# Patient Record
Sex: MALE | Race: WHITE | NOT HISPANIC OR LATINO | Employment: OTHER | URBAN - METROPOLITAN AREA
[De-identification: names, ages, dates, MRNs, and addresses within clinical notes are randomized per-mention and may not be internally consistent; named-entity substitution may affect disease eponyms.]

---

## 2017-05-18 ENCOUNTER — PATIENT MESSAGE (OUTPATIENT)
Dept: INTERNAL MEDICINE | Facility: CLINIC | Age: 57
End: 2017-05-18

## 2017-05-23 ENCOUNTER — OFFICE VISIT (OUTPATIENT)
Dept: SLEEP MEDICINE | Facility: CLINIC | Age: 57
End: 2017-05-23
Attending: INTERNAL MEDICINE
Payer: COMMERCIAL

## 2017-05-23 ENCOUNTER — LAB VISIT (OUTPATIENT)
Dept: LAB | Facility: OTHER | Age: 57
End: 2017-05-23
Attending: INTERNAL MEDICINE
Payer: COMMERCIAL

## 2017-05-23 ENCOUNTER — OFFICE VISIT (OUTPATIENT)
Dept: INTERNAL MEDICINE | Facility: CLINIC | Age: 57
End: 2017-05-23
Attending: INTERNAL MEDICINE
Payer: COMMERCIAL

## 2017-05-23 VITALS
BODY MASS INDEX: 28.83 KG/M2 | HEIGHT: 71 IN | HEART RATE: 86 BPM | OXYGEN SATURATION: 97 % | SYSTOLIC BLOOD PRESSURE: 136 MMHG | WEIGHT: 205.94 LBS | DIASTOLIC BLOOD PRESSURE: 82 MMHG

## 2017-05-23 VITALS
WEIGHT: 205.69 LBS | HEIGHT: 71 IN | HEART RATE: 64 BPM | BODY MASS INDEX: 28.8 KG/M2 | OXYGEN SATURATION: 98 % | DIASTOLIC BLOOD PRESSURE: 86 MMHG | SYSTOLIC BLOOD PRESSURE: 148 MMHG

## 2017-05-23 DIAGNOSIS — Z00.00 ANNUAL PHYSICAL EXAM: ICD-10-CM

## 2017-05-23 DIAGNOSIS — Z00.00 ANNUAL PHYSICAL EXAM: Primary | ICD-10-CM

## 2017-05-23 DIAGNOSIS — Z86.19 HISTORY OF HEPATITIS C: ICD-10-CM

## 2017-05-23 DIAGNOSIS — Z72.0 TOBACCO ABUSE: ICD-10-CM

## 2017-05-23 DIAGNOSIS — R06.81 WITNESSED EPISODE OF APNEA: ICD-10-CM

## 2017-05-23 DIAGNOSIS — G47.33 OSA (OBSTRUCTIVE SLEEP APNEA): Primary | ICD-10-CM

## 2017-05-23 DIAGNOSIS — M54.2 CHRONIC NECK PAIN: ICD-10-CM

## 2017-05-23 DIAGNOSIS — G89.29 CHRONIC NECK PAIN: ICD-10-CM

## 2017-05-23 DIAGNOSIS — Z11.59 NEED FOR HEPATITIS C SCREENING TEST: ICD-10-CM

## 2017-05-23 LAB
ALBUMIN SERPL BCP-MCNC: 4.1 G/DL
ALP SERPL-CCNC: 53 U/L
ALT SERPL W/O P-5'-P-CCNC: 20 U/L
ANION GAP SERPL CALC-SCNC: 12 MMOL/L
AST SERPL-CCNC: 20 U/L
BASOPHILS # BLD AUTO: 0.04 K/UL
BASOPHILS NFR BLD: 0.8 %
BILIRUB SERPL-MCNC: 0.6 MG/DL
BUN SERPL-MCNC: 17 MG/DL
CALCIUM SERPL-MCNC: 9.6 MG/DL
CHLORIDE SERPL-SCNC: 105 MMOL/L
CHOLEST/HDLC SERPL: 4.4 {RATIO}
CO2 SERPL-SCNC: 24 MMOL/L
COMPLEXED PSA SERPL-MCNC: 1.1 NG/ML
CREAT SERPL-MCNC: 1 MG/DL
DIFFERENTIAL METHOD: ABNORMAL
EOSINOPHIL # BLD AUTO: 0.2 K/UL
EOSINOPHIL NFR BLD: 2.8 %
ERYTHROCYTE [DISTWIDTH] IN BLOOD BY AUTOMATED COUNT: 13.2 %
EST. GFR  (AFRICAN AMERICAN): >60 ML/MIN/1.73 M^2
EST. GFR  (NON AFRICAN AMERICAN): >60 ML/MIN/1.73 M^2
GLUCOSE SERPL-MCNC: 97 MG/DL
HCT VFR BLD AUTO: 44.6 %
HDL/CHOLESTEROL RATIO: 22.8 %
HDLC SERPL-MCNC: 193 MG/DL
HDLC SERPL-MCNC: 44 MG/DL
HGB BLD-MCNC: 14.8 G/DL
LDLC SERPL CALC-MCNC: 124.4 MG/DL
LYMPHOCYTES # BLD AUTO: 2 K/UL
LYMPHOCYTES NFR BLD: 38.1 %
MCH RBC QN AUTO: 31.9 PG
MCHC RBC AUTO-ENTMCNC: 33.2 %
MCV RBC AUTO: 96 FL
MONOCYTES # BLD AUTO: 0.4 K/UL
MONOCYTES NFR BLD: 8.3 %
NEUTROPHILS # BLD AUTO: 2.6 K/UL
NEUTROPHILS NFR BLD: 49.8 %
NONHDLC SERPL-MCNC: 149 MG/DL
PLATELET # BLD AUTO: 209 K/UL
PMV BLD AUTO: 11.2 FL
POTASSIUM SERPL-SCNC: 4.4 MMOL/L
PROT SERPL-MCNC: 7.4 G/DL
RBC # BLD AUTO: 4.64 M/UL
SODIUM SERPL-SCNC: 141 MMOL/L
TRIGL SERPL-MCNC: 123 MG/DL
TSH SERPL DL<=0.005 MIU/L-ACNC: 2.2 UIU/ML
WBC # BLD AUTO: 5.27 K/UL

## 2017-05-23 PROCEDURE — 86803 HEPATITIS C AB TEST: CPT

## 2017-05-23 PROCEDURE — 99243 OFF/OP CNSLTJ NEW/EST LOW 30: CPT | Mod: S$GLB,,, | Performed by: INTERNAL MEDICINE

## 2017-05-23 PROCEDURE — 36415 COLL VENOUS BLD VENIPUNCTURE: CPT

## 2017-05-23 PROCEDURE — 1160F RVW MEDS BY RX/DR IN RCRD: CPT | Mod: S$GLB,,, | Performed by: INTERNAL MEDICINE

## 2017-05-23 PROCEDURE — 80053 COMPREHEN METABOLIC PANEL: CPT

## 2017-05-23 PROCEDURE — 85025 COMPLETE CBC W/AUTO DIFF WBC: CPT

## 2017-05-23 PROCEDURE — 99999 PR PBB SHADOW E&M-EST. PATIENT-LVL IV: CPT | Mod: PBBFAC,,, | Performed by: INTERNAL MEDICINE

## 2017-05-23 PROCEDURE — 80061 LIPID PANEL: CPT

## 2017-05-23 PROCEDURE — 87522 HEPATITIS C REVRS TRNSCRPJ: CPT

## 2017-05-23 PROCEDURE — 83036 HEMOGLOBIN GLYCOSYLATED A1C: CPT

## 2017-05-23 PROCEDURE — 99214 OFFICE O/P EST MOD 30 MIN: CPT | Mod: S$GLB,,, | Performed by: INTERNAL MEDICINE

## 2017-05-23 PROCEDURE — 84153 ASSAY OF PSA TOTAL: CPT

## 2017-05-23 PROCEDURE — 99999 PR PBB SHADOW E&M-EST. PATIENT-LVL III: CPT | Mod: PBBFAC,,, | Performed by: INTERNAL MEDICINE

## 2017-05-23 PROCEDURE — 84443 ASSAY THYROID STIM HORMONE: CPT

## 2017-05-23 RX ORDER — DIPHENHYDRAMINE HCL 25 MG
4 CAPSULE ORAL
Qty: 50 EACH | Refills: 5 | Status: SHIPPED | OUTPATIENT
Start: 2017-05-23 | End: 2017-10-22 | Stop reason: SDUPTHER

## 2017-05-23 NOTE — PROGRESS NOTES
Subjective:       Patient ID: Fabian Ivy is a 57 y.o. male.    Chief Complaint: Fatigue    Here for annual exam    Mother recently passed. Continues to suffer from low energy. He wakes feeling un refreshed. He states he no longer has energy to exercise when he gets home. He admits he does have occasional depressed mood but overall does not suffer from daily depressive symptoms.  Wakes not feeling refreshed. Does not exercise. Girlfriend has mentioned that he stops breathing for a second or two. Nocturia 1-2 times a night if he has beers prior to bed. Denies urinary hesitancy, incomplete emptying, urgency or incontinence. Some occasional post void dribbling. Hx of HCV s/p harvoni. Quit smoking for 3 months, relapsed a little during [place] but has now been cigarette free for 3 weeks. He is optimistic and excited about continued cessation. Continued neck pain. He was taking [id number] 1/4-1/2 BID. But stopped this as of 3 weeks ago to see if it was at all contributing to his low energy levels.  Managed by neurosurgeon Luis on [facility]. He is scheduled for updated cervical MRI in near future.               Fatigue   Associated symptoms include fatigue. Pertinent negatives include no abdominal pain, chest pain, chills, coughing, fever, nausea, numbness, rash, sore throat or vomiting.       Review of Systems   Constitutional: Positive for fatigue. Negative for appetite change, chills, fever and unexpected weight change.   HENT: Negative for hearing loss, sore throat and trouble swallowing.    Eyes: Negative for visual disturbance.   Respiratory: Negative for cough, chest tightness and shortness of breath.    Cardiovascular: Negative for chest pain and leg swelling.   Gastrointestinal: Negative for abdominal pain, blood in stool, constipation, diarrhea, nausea and vomiting.   Endocrine: Negative for polydipsia and polyuria.   Genitourinary: Negative for decreased urine volume, difficulty urinating, dysuria,  "frequency, hematuria and urgency.   Musculoskeletal: Negative for gait problem.   Skin: Negative for rash.   Neurological: Negative for dizziness and numbness.   Psychiatric/Behavioral: The patient is not nervous/anxious.        Objective:      Vitals:    05/23/17 1024   BP: 136/82   Pulse: 86   SpO2: 97%   Weight: 93.4 kg (205 lb 14.6 oz)   Height: 5' 11" (1.803 m)      Physical Exam   Constitutional: He is oriented to person, place, and time. He appears well-developed and well-nourished. He does not have a sickly appearance. No distress.   HENT:   Head: Normocephalic and atraumatic.   Eyes: Conjunctivae and EOM are normal. Right eye exhibits no discharge. Left eye exhibits no discharge. No scleral icterus.   Pulmonary/Chest: Effort normal. No respiratory distress.   Abdominal: Normal appearance. He exhibits no distension.   Neurological: He is alert and oriented to person, place, and time.   Skin: Skin is warm and dry. He is not diaphoretic.   Psychiatric: He has a normal mood and affect. His speech is normal.       Assessment:       1. Annual physical exam    2. Witnessed episode of apnea    3. History of hepatitis C    4. Tobacco abuse    5. Chronic neck pain        Plan:       Fabian was seen today for fatigue.    Diagnoses and all orders for this visit:    Annual physical exam  -     Hemoglobin A1c; Future  -     Comprehensive metabolic panel; Future  -     CBC auto differential; Future  -     Lipid panel; Future  -     TSH; Future  -     PSA, Screening; Future    Witnessed episode of apnea  -     Ambulatory consult for Sleep Study    History of hepatitis C  Comments:  Tx Harvoni via [facility] 2015  Orders:  -     HEPATITIS C RNA, QUANTITATIVE, PCR; Future    Tobacco abuse  -     Ambulatory referral to Smoking Cessation Program  -     nicotine polacrilex (NICORETTE) 4 MG Gum; Take 1 each (4 mg total) by mouth as needed.  Chronic neck pain  -f/u with [facility]. Rec daily HEP    RTC in 6 months             Side effects " of medication(s) were discussed in detail and patient voiced understanding.  Patient will call back for any issues or complications.

## 2017-05-23 NOTE — LETTER
May 23, 2017      Dieter Rice MD  [address]  [address]  [facility] [address]           [facility]  [address]  [facility] [address]  Phone: [phone]          Patient: Fabian Ivy   MR Number: [id number]   YOB: 1960   Date of Visit: 5/23/2017       Dear Dr. Dieter Rice:    Thank you for referring Fabian Ivy to me for evaluation. Attached you will find relevant portions of my assessment and plan of care.    If you have questions, please do not hesitate to call me. I look forward to following Fabian Ivy along with you.    Sincerely,    Peter Grimes MD    Enclosure  CC:  No Recipients    If you would like to receive this communication electronically, please contact [email or web address] or [phone] to request more information on [employer] access.    For providers and/or their staff who would like to refer a patient to [facility], please contact us through our one-stop-shop provider referral line, [facility], at [phone].    If you feel you have received this communication in error or would no longer like to receive these types of communications, please e-mail [email or web address]

## 2017-05-23 NOTE — PROGRESS NOTES
Fabian Ivy  was seen as a new patient at the request of  Dieter Rice MD for the evaluation of  ramon.    CHIEF COMPLAINT:  ramon    HISTORY OF PRESENT ILLNESS: Fabian Ivy is a 57 y.o. male is here for sleep evaluation.   Patient with loud snoring that's worse when sleeping on back.  +witnessed apnea by girlfriend.  +fatigue upon awake 3-4 times per week.  No sleepiness a/w driving.  No parasomnia.  No cataplexy.      +itchiness in shin area that improve with lotion.      [place] Sleepiness Scale score during initial sleep evaluation was 13.    SLEEP ROUTINE:  Activity the hour prior to sleep:  Shower and lotion    Bed partner:  girlfriend  Time to bed:  11 pm   Lights off:  yes  Sleep onset latency:  10 minutes        Disruptions or awakenings:    2 times    Wakeup time:     7 am   Perceived sleep quality:   Tire upon awake on most days       Daytime naps:      Occasional 60 minutes (tire upon awake)  Weekend sleep routine:      11 pm to 8 am   Caffeine use: 1-2 cups per day  exercise habit:   Bike 1 hour per day      PAST MEDICAL HISTORY:    Active Ambulatory Problems     Diagnosis Date Noted    Witnessed episode of apnea 05/23/2017    History of hepatitis C 05/23/2017    Chronic neck pain 05/23/2017    Tobacco abuse 05/23/2017     Resolved Ambulatory Problems     Diagnosis Date Noted    No Resolved Ambulatory Problems     Past Medical History:   Diagnosis Date    Hepatitis C     Hypertension                 PAST SURGICAL HISTORY:    No past surgical history on file.      FAMILY HISTORY:                Family History   Problem Relation Age of Onset    Cancer Father      lung       SOCIAL HISTORY:          Tobacco:   History   Smoking Status    Former Smoker    Packs/day: 0.50    Years: 35.00    Types: Cigarettes    Quit date: 1/23/2017   Smokeless Tobacco    Not on file       alcohol use:    History   Alcohol Use No                 Occupation:  [employer]    ALLERGIES:  Review of patient's  "allergies indicates:  No Known Allergies    CURRENT MEDICATIONS:    Current Outpatient Prescriptions   Medication Sig Dispense Refill    cyclobenzaprine (FLEXERIL) 10 MG tablet Take 1 tablet (10 mg total) by mouth 3 (three) times daily as needed for Muscle spasms. 90 tablet 2    etodolac (LODINE XL) 400 MG 24 hr tablet   2    hydrocodone-acetaminophen 10-325mg (NORCO)  mg Tab       nicotine polacrilex (NICORETTE) 4 MG Gum Take 1 each (4 mg total) by mouth as needed. 50 each 5     No current facility-administered medications for this visit.                   REVIEW OF SYSTEMS:     Sleep related symptoms as per HPI.  CONST:Denies weight gain    HEENT: rare sinus congestion  PULM: Denies dyspnea  CARD:  Denies palpitations   GI:  Denies acid reflux  : Denies polyuria  NEURO: Denies headaches  PSYCH: Denies mood disturbance  HEME: Denies anemia   Otherwise, a balance of systems reviewed is negative.          PHYSICAL EXAM:  Vitals:    05/23/17 1151   BP: (!) 148/86   Pulse: 64   SpO2: 98%   Weight: 93.3 kg (205 lb 11 oz)   Height: 5' 11" (1.803 m)   PainSc: 0-No pain     Body mass index is 28.69 kg/m².     GENERAL: Normal development, well groomed  HEENT:  Conjunctivae are non-erythematous; Pupils equal, round, and reactive to light; Nose is symmetrical; Nasal mucosa is pink and moist; Septum is midline; Inferior turbinates are normal; Nasal airflow is normal; Posterior pharynx is pink; Modified Mallampati: 4; Posterior palate is normal; Tonsils +1; Uvula is normal and pink;Tongue is normal; Dentition is fair; No TMJ tenderness; Jaw opening and protrusion without click and without discomfort.  NECK: Supple. Neck circumference is 16 inches. No thyromegaly. No palpable nodes.     SKIN: On face and neck: No abrasions, no rashes, no lesions.  No subcutaneous nodules are palpable.  RESPIRATORY: Chest is clear to auscultation.  Normal chest expansion and non-labored breathing at rest.  CARDIOVASCULAR: Normal S1, " S2.  No murmurs, gallops or rubs. No carotid bruits bilaterally.  EXTREMITIES: No edema. No clubbing. No cyanosis. Station normal. Gait normal.        NEURO/PSYCH: Oriented to time, place and person. Normal attention span and concentration. Affect is full. Mood is normal.                                              DATA no prior sleep study  Lab Results   Component Value Date    TSH 1.186 09/09/2015     ASSESSMENT    ICD-10-CM ICD-9-CM    1. TORY (obstructive sleep apnea) G47.33 327.23 Home Sleep Studies       PLAN:    Sleep Apnea NEC. The patient symptomatically has loud snoring, restless sleep, daytime somnolence with findings of enlarged neck. This warrants further investigation for possible obstructive sleep apnea.  Patient will be contacted after sleep study is done.        Diagnostic: Polysomnogram. The nature of this procedure and its indication was discussed with the patient.     Education: During our discussion today, we talked about the etiology of obstructive sleep apnea as well as the potential ramifications of untreated sleep apnea, which could include daytime sleepiness, hypertension, heart disease and/or stroke.     Precautions: The patient was advised to abstain from driving should they feel sleepy or drowsy.       Thank you for allowing me the opportunity to participate in the care of your patient.    Patient will No Follow-up on file.    Please cc note to  Dieter Rice MD.

## 2017-05-24 ENCOUNTER — TELEPHONE (OUTPATIENT)
Dept: SLEEP MEDICINE | Facility: OTHER | Age: 57
End: 2017-05-24

## 2017-05-24 LAB
ESTIMATED AVG GLUCOSE: 123 MG/DL
HBA1C MFR BLD HPLC: 5.9 %
HCV AB SERPL QL IA: POSITIVE

## 2017-05-25 LAB
HCV LOG: <1.08 LOG (10) IU/ML
HCV RNA QUANT PCR: <12 IU/ML
HCV, QUALITATIVE: NOT DETECTED IU/ML

## 2017-05-26 ENCOUNTER — PATIENT MESSAGE (OUTPATIENT)
Dept: INTERNAL MEDICINE | Facility: CLINIC | Age: 57
End: 2017-05-26

## 2017-05-29 NOTE — TELEPHONE ENCOUNTER
Patient concerned about Hep C test. Patient states he had treatment about a year ago and wants to know what to do now.  Please advise.

## 2017-06-27 ENCOUNTER — TELEPHONE (OUTPATIENT)
Dept: SLEEP MEDICINE | Facility: OTHER | Age: 57
End: 2017-06-27

## 2017-06-28 ENCOUNTER — HOSPITAL ENCOUNTER (OUTPATIENT)
Dept: SLEEP MEDICINE | Facility: OTHER | Age: 57
Discharge: HOME | End: 2017-06-28
Attending: INTERNAL MEDICINE
Payer: COMMERCIAL

## 2017-06-28 DIAGNOSIS — G47.33 OSA (OBSTRUCTIVE SLEEP APNEA): ICD-10-CM

## 2017-06-28 PROCEDURE — 95800 SLP STDY UNATTENDED: CPT | Mod: 26,,, | Performed by: INTERNAL MEDICINE

## 2017-06-28 PROCEDURE — 95800 SLP STDY UNATTENDED: CPT

## 2017-07-17 ENCOUNTER — OFFICE VISIT (OUTPATIENT)
Dept: PULMONOLOGY | Facility: CLINIC | Age: 57
End: 2017-07-17
Payer: COMMERCIAL

## 2017-07-17 VITALS
DIASTOLIC BLOOD PRESSURE: 98 MMHG | SYSTOLIC BLOOD PRESSURE: 144 MMHG | BODY MASS INDEX: 28.7 KG/M2 | HEART RATE: 73 BPM | WEIGHT: 205 LBS | HEIGHT: 71 IN | OXYGEN SATURATION: 97 %

## 2017-07-17 DIAGNOSIS — G47.33 OSA (OBSTRUCTIVE SLEEP APNEA): Primary | ICD-10-CM

## 2017-07-17 PROCEDURE — 99214 OFFICE O/P EST MOD 30 MIN: CPT | Mod: S$GLB,,, | Performed by: INTERNAL MEDICINE

## 2017-07-17 PROCEDURE — 99999 PR PBB SHADOW E&M-EST. PATIENT-LVL III: CPT | Mod: PBBFAC,,, | Performed by: INTERNAL MEDICINE

## 2017-07-17 NOTE — PROGRESS NOTES
Fabian Ivy  was seen as a follow up.    CHIEF COMPLAINT:  tory    HISTORY OF PRESENT ILLNESS: Fabian Ivy is a 57 y.o. male is here for sleep evaluation.   Patient with loud snoring that's worse when sleeping on back.  +witnessed apnea by girlfriend.  +fatigue upon awake 3-4 times per week.  No sleepiness a/w driving.  No parasomnia.  No cataplexy.      +itchiness in shin area that improve with lotion.      [place] Sleepiness Scale score during initial sleep evaluation was 13.    S/p hst on 6/28/17 since last visit.  No new issue.      SLEEP ROUTINE:  Activity the hour prior to sleep:  Shower and lotion    Bed partner:  girlfriend  Time to bed:  11 pm   Lights off:  yes  Sleep onset latency:  10 minutes        Disruptions or awakenings:    2 times    Wakeup time:     7 am   Perceived sleep quality:   Tire upon awake on most days       Daytime naps:      Occasional 60 minutes (tire upon awake)  Weekend sleep routine:      11 pm to 8 am   Caffeine use: 1-2 cups per day  exercise habit:   Bike 1 hour per day      PAST MEDICAL HISTORY:    Active Ambulatory Problems     Diagnosis Date Noted    Witnessed episode of apnea 05/23/2017    History of hepatitis C 05/23/2017    Chronic neck pain 05/23/2017    Tobacco abuse 05/23/2017    TORY (obstructive sleep apnea)      Resolved Ambulatory Problems     Diagnosis Date Noted    No Resolved Ambulatory Problems     Past Medical History:   Diagnosis Date    Hepatitis C     Hypertension                 PAST SURGICAL HISTORY:    History reviewed. No pertinent surgical history.      FAMILY HISTORY:                Family History   Problem Relation Age of Onset    Cancer Father      lung       SOCIAL HISTORY:          Tobacco:   History   Smoking Status    Former Smoker    Packs/day: 0.50    Years: 35.00    Types: Cigarettes    Quit date: 1/23/2017   Smokeless Tobacco    Current User     Comment: nicorette gum       alcohol use:    History   Alcohol Use No              "    Occupation:  [employer]    ALLERGIES:  Review of patient's allergies indicates:  No Known Allergies    CURRENT MEDICATIONS:    Current Outpatient Prescriptions   Medication Sig Dispense Refill    cyclobenzaprine (FLEXERIL) 10 MG tablet Take 1 tablet (10 mg total) by mouth 3 (three) times daily as needed for Muscle spasms. 90 tablet 2    etodolac (LODINE XL) 400 MG 24 hr tablet   2    hydrocodone-acetaminophen 10-325mg (NORCO)  mg Tab       nicotine polacrilex (NICORETTE) 4 MG Gum Take 1 each (4 mg total) by mouth as needed. 50 each 5     No current facility-administered medications for this visit.                   REVIEW OF SYSTEMS:     Sleep related symptoms as per HPI.  CONST:Denies weight gain    HEENT: rare sinus congestion  PULM: Denies dyspnea  CARD:  Denies palpitations   GI:  Denies acid reflux  : Denies polyuria  NEURO: Denies headaches  PSYCH: Denies mood disturbance  HEME: Denies anemia   Otherwise, a balance of systems reviewed is negative.          PHYSICAL EXAM:  Vitals:    07/17/17 1154   BP: (!) 144/98   Pulse: 73   SpO2: 97%   Weight: 93 kg (205 lb)   Height: 5' 11" (1.803 m)   PainSc: 0-No pain     Body mass index is 28.59 kg/m².     GENERAL: Normal development, well groomed  HEENT:  Conjunctivae are non-erythematous; Pupils equal, round, and reactive to light; Nose is symmetrical; Nasal mucosa is pink and moist; Septum is midline; Inferior turbinates are normal; Nasal airflow is normal; Posterior pharynx is pink; Modified Mallampati: 4; Posterior palate is normal; Tonsils +1; Uvula is normal and pink;Tongue is normal; Dentition is fair; No TMJ tenderness; Jaw opening and protrusion without click and without discomfort.  NECK: Supple. Neck circumference is 16 inches. No thyromegaly. No palpable nodes.     SKIN: On face and neck: No abrasions, no rashes, no lesions.  No subcutaneous nodules are palpable.  RESPIRATORY: Chest is clear to auscultation.  Normal chest expansion and " non-labored breathing at rest.  CARDIOVASCULAR: Normal S1, S2.  No murmurs, gallops or rubs. No carotid bruits bilaterally.  EXTREMITIES: No edema. No clubbing. No cyanosis. Station normal. Gait normal.        NEURO/PSYCH: Oriented to time, place and person. Normal attention span and concentration. Affect is full. Mood is normal.                                              DATA  6/28/17 hst ahi of 28  Lab Results   Component Value Date    TSH 2.202 05/23/2017     ASSESSMENT    ICD-10-CM ICD-9-CM    1. TORY (obstructive sleep apnea) G47.33 327.23 CPAP FOR HOME USE       PLAN:    Sleep Apnea - result of hst d/w patient.  Recommend treatment.  Patient is agreeable to apap.      Education: During our discussion today, we talked about the etiology of obstructive sleep apnea as well as the potential ramifications of untreated sleep apnea, which could include daytime sleepiness, hypertension, heart disease and/or stroke.     Precautions: The patient was advised to abstain from driving should they feel sleepy or drowsy.       Patient will Return in about 3 months (around 10/17/2017).    This is 25 minutes visit, over 50% of time spent in direct consultation with patient.

## 2017-10-23 RX ORDER — DIPHENHYDRAMINE HCL 25 MG
CAPSULE ORAL
Qty: 160 EACH | Refills: 2 | Status: SHIPPED | OUTPATIENT
Start: 2017-10-23 | End: 2018-05-13 | Stop reason: SDUPTHER

## 2017-10-30 ENCOUNTER — OFFICE VISIT (OUTPATIENT)
Dept: PULMONOLOGY | Facility: CLINIC | Age: 57
End: 2017-10-30
Payer: COMMERCIAL

## 2017-10-30 VITALS
DIASTOLIC BLOOD PRESSURE: 90 MMHG | HEIGHT: 71 IN | OXYGEN SATURATION: 98 % | SYSTOLIC BLOOD PRESSURE: 130 MMHG | BODY MASS INDEX: 28.7 KG/M2 | WEIGHT: 205 LBS | HEART RATE: 70 BPM

## 2017-10-30 DIAGNOSIS — G47.33 OSA (OBSTRUCTIVE SLEEP APNEA): Primary | ICD-10-CM

## 2017-10-30 PROCEDURE — 99214 OFFICE O/P EST MOD 30 MIN: CPT | Mod: S$GLB,,, | Performed by: INTERNAL MEDICINE

## 2017-10-30 PROCEDURE — 99999 PR PBB SHADOW E&M-EST. PATIENT-LVL IV: CPT | Mod: PBBFAC,,, | Performed by: INTERNAL MEDICINE

## 2017-10-30 NOTE — PROGRESS NOTES
Fabian Ivy  was seen as a follow up.    CHIEF COMPLAINT:  tory    HISTORY OF PRESENT ILLNESS: Fabian Ivy is a 57 y.o. male is here for follow up for his sleep apnea. He is feeling well today with no complaints. He states he has been using his CPAP every night. He takes it off ~2 times per night when he gets up to go to the bathroom. He has noted improvement in his daytime sleepiness and states his sleep has improved since he began wearing the mask 2 months ago. Patient is now wearing mask 90% of nights and for an average of 6 hours per night.     He presented to sleep clinic in May 2017 after his girlfriend reported loud snoring that's worse when sleeping on back.  +witnessed apnea by girlfriend.  +fatigue upon awake 3-4 times per week.  No sleepiness a/w driving.  No parasomnia.  No cataplexy.  He is s/p sleep study on 6/28    [place] Sleepiness Scale score during initial sleep evaluation was 13.      SLEEP ROUTINE:  Activity the hour prior to sleep:  Shower and lotion    Bed partner:  girlfriend  Time to bed:  11 pm   Lights off:  yes  Sleep onset latency:  10 minutes        Disruptions or awakenings:    2 times    Wakeup time:     7 am   Perceived sleep quality:   Improvement in daytime sleepiness since wearing mask.   Daytime naps:      Occasional 60 minutes (tire upon awake)  Weekend sleep routine:      11 pm to 8 am   Caffeine use: 1-2 cups per day  exercise habit:   Bike 1 hour per day      PAST MEDICAL HISTORY:    Active Ambulatory Problems     Diagnosis Date Noted    Witnessed episode of apnea 05/23/2017    History of hepatitis C 05/23/2017    Chronic neck pain 05/23/2017    Tobacco abuse 05/23/2017    TORY (obstructive sleep apnea)      Resolved Ambulatory Problems     Diagnosis Date Noted    No Resolved Ambulatory Problems     Past Medical History:   Diagnosis Date    Hepatitis C     Hypertension                 PAST SURGICAL HISTORY:    History reviewed. No pertinent surgical history.      FAMILY  "HISTORY:                Family History   Problem Relation Age of Onset    Cancer Father      lung       SOCIAL HISTORY:          Tobacco:   History   Smoking Status    Former Smoker    Packs/day: 0.50    Years: 35.00    Types: Cigarettes    Quit date: 1/23/2017   Smokeless Tobacco    Current User     Comment: nicorette gum       alcohol use:    History   Alcohol Use No                 Occupation:  [employer]    ALLERGIES:  Review of patient's allergies indicates:  No Known Allergies    CURRENT MEDICATIONS:    Current Outpatient Prescriptions   Medication Sig Dispense Refill    cyclobenzaprine (FLEXERIL) 10 MG tablet Take 1 tablet (10 mg total) by mouth 3 (three) times daily as needed for Muscle spasms. 90 tablet 2    etodolac (LODINE XL) 400 MG 24 hr tablet   2    hydrocodone-acetaminophen 10-325mg (NORCO)  mg Tab       nicotine polacrilex (NICORETTE) 4 MG Gum CHEW AS NEEDED 160 each 2     No current facility-administered medications for this visit.                   REVIEW OF SYSTEMS:     Sleep related symptoms as per HPI.  CONST:Denies weight gain    HEENT: rare sinus congestion  PULM: Denies dyspnea  CARD:  Denies palpitations   GI:  Denies acid reflux  : Denies polyuria  NEURO: Denies headaches  PSYCH: Denies mood disturbance  HEME: Denies anemia   Otherwise, a balance of systems reviewed is negative.          PHYSICAL EXAM:  Vitals:    10/30/17 0924   BP: (!) 130/90   Pulse: 70   SpO2: 98%   Weight: 93 kg (205 lb)   Height: 5' 11" (1.803 m)   PainSc:   4   PainLoc: Shoulder     Body mass index is 28.59 kg/m².     GENERAL: Normal development, well groomed  HEENT:  Conjunctivae are non-erythematous; Pupils equal, round, and reactive to light; Nose is symmetrical; Nasal mucosa is pink and moist; Septum is midline; Inferior turbinates are normal; Nasal airflow is normal; Posterior pharynx is pink; Modified Mallampati: 4; Posterior palate is normal; Tonsils +1; Uvula is normal and pink;Tongue " is normal; Dentition is fair; No TMJ tenderness; Jaw opening and protrusion without click and without discomfort.  NECK: Supple. Neck circumference is 16 inches. No thyromegaly. No palpable nodes.     SKIN: On face and neck: No abrasions, no rashes, no lesions.  No subcutaneous nodules are palpable.  RESPIRATORY: Chest is clear to auscultation.  Normal chest expansion and non-labored breathing at rest.  CARDIOVASCULAR: Normal S1, S2.  No murmurs, gallops or rubs. No carotid bruits bilaterally.  EXTREMITIES: No edema. No clubbing. No cyanosis. Station normal. Gait normal.        NEURO/PSYCH: Oriented to time, place and person. Normal attention span and concentration. Affect is full. Mood is normal.                                              DATA  6/28/17 hst ahi of 28  Lab Results   Component Value Date    TSH 2.202 05/23/2017     ASSESSMENT    ICD-10-CM ICD-9-CM    1. TORY (obstructive sleep apnea) G47.33 327.23        PLAN:    Sleep Apnea: Patient is now wearing mask 90% of nights and for an average of 6 hours per night. Improvement in symptoms of daytime sleepiness and restless sleep since wearing the mask. No current issues. Recommended patient wear [facility] for weekend trips.     Education:  During our discussion today, we talked about the etiology of obstructive sleep apnea as well as the potential ramifications of untreated sleep apnea, which could include daytime sleepiness, hypertension, heart disease and/or stroke.     Precautions: The patient was advised to abstain from driving should they feel sleepy or drowsy.       Patient will Return in about 1 year (around 10/30/2018).     Discussed with Dr. Grimes.     Radha Guzman MD PGY-3

## 2017-11-13 RX ORDER — NICOTINE POLACRILEX 4 MG/1
GUM, CHEWING ORAL
Qty: 50 EACH | Refills: 0 | Status: SHIPPED | OUTPATIENT
Start: 2017-11-13 | End: 2021-11-16

## 2018-01-04 RX ORDER — CYCLOBENZAPRINE HCL 10 MG
TABLET ORAL
Qty: 90 TABLET | Refills: 0 | Status: SHIPPED | OUTPATIENT
Start: 2018-01-04 | End: 2018-11-25 | Stop reason: SDUPTHER

## 2018-05-14 RX ORDER — DIPHENHYDRAMINE HCL 25 MG
CAPSULE ORAL
Qty: 170 EACH | Refills: 1 | Status: SHIPPED | OUTPATIENT
Start: 2018-05-14 | End: 2021-11-16

## 2018-09-23 RX ORDER — DIPHENHYDRAMINE HCL 25 MG
CAPSULE ORAL
Refills: 0 | OUTPATIENT
Start: 2018-09-23

## 2018-11-25 ENCOUNTER — OFFICE VISIT (OUTPATIENT)
Dept: URGENT CARE | Facility: CLINIC | Age: 58
End: 2018-11-25
Payer: COMMERCIAL

## 2018-11-25 VITALS
WEIGHT: 205 LBS | OXYGEN SATURATION: 98 % | RESPIRATION RATE: 16 BRPM | HEART RATE: 63 BPM | BODY MASS INDEX: 28.7 KG/M2 | SYSTOLIC BLOOD PRESSURE: 121 MMHG | TEMPERATURE: 99 F | DIASTOLIC BLOOD PRESSURE: 79 MMHG | HEIGHT: 71 IN

## 2018-11-25 DIAGNOSIS — Z87.39 HISTORY OF HERNIATED INTERVERTEBRAL DISC: ICD-10-CM

## 2018-11-25 DIAGNOSIS — J06.9 URI WITH COUGH AND CONGESTION: Primary | ICD-10-CM

## 2018-11-25 DIAGNOSIS — M62.838 MUSCLE SPASMS OF NECK: ICD-10-CM

## 2018-11-25 LAB
CTP QC/QA: YES
FLUAV AG NPH QL: NEGATIVE
FLUBV AG NPH QL: NEGATIVE

## 2018-11-25 PROCEDURE — 87804 INFLUENZA ASSAY W/OPTIC: CPT | Mod: QW,S$GLB,, | Performed by: NURSE PRACTITIONER

## 2018-11-25 PROCEDURE — 3008F BODY MASS INDEX DOCD: CPT | Mod: CPTII,S$GLB,, | Performed by: NURSE PRACTITIONER

## 2018-11-25 PROCEDURE — 99214 OFFICE O/P EST MOD 30 MIN: CPT | Mod: 25,S$GLB,, | Performed by: NURSE PRACTITIONER

## 2018-11-25 PROCEDURE — 96372 THER/PROPH/DIAG INJ SC/IM: CPT | Mod: S$GLB,,, | Performed by: EMERGENCY MEDICINE

## 2018-11-25 RX ORDER — ETODOLAC 400 MG/1
TABLET, FILM COATED ORAL
Refills: 1 | COMMUNITY
Start: 2018-10-31 | End: 2023-02-27

## 2018-11-25 RX ORDER — BETAMETHASONE SODIUM PHOSPHATE AND BETAMETHASONE ACETATE 3; 3 MG/ML; MG/ML
9 INJECTION, SUSPENSION INTRA-ARTICULAR; INTRALESIONAL; INTRAMUSCULAR; SOFT TISSUE
Status: COMPLETED | OUTPATIENT
Start: 2018-11-25 | End: 2018-11-25

## 2018-11-25 RX ORDER — BENZONATATE 100 MG/1
200 CAPSULE ORAL 3 TIMES DAILY PRN
Qty: 30 CAPSULE | Refills: 0 | Status: SHIPPED | OUTPATIENT
Start: 2018-11-25 | End: 2019-05-06

## 2018-11-25 RX ORDER — FLUTICASONE PROPIONATE 50 MCG
2 SPRAY, SUSPENSION (ML) NASAL DAILY
Qty: 1 BOTTLE | Refills: 0 | Status: SHIPPED | OUTPATIENT
Start: 2018-11-25 | End: 2019-05-06

## 2018-11-25 RX ORDER — CYCLOBENZAPRINE HCL 10 MG
10 TABLET ORAL 3 TIMES DAILY PRN
Qty: 30 TABLET | Refills: 0 | Status: SHIPPED | OUTPATIENT
Start: 2018-11-25 | End: 2019-05-06

## 2018-11-25 RX ORDER — KETOROLAC TROMETHAMINE 30 MG/ML
30 INJECTION, SOLUTION INTRAMUSCULAR; INTRAVENOUS
Status: COMPLETED | OUTPATIENT
Start: 2018-11-25 | End: 2018-11-25

## 2018-11-25 RX ORDER — FLUCONAZOLE 150 MG/1
TABLET ORAL
Refills: 1 | COMMUNITY
Start: 2018-10-30 | End: 2019-05-06

## 2018-11-25 RX ADMIN — BETAMETHASONE SODIUM PHOSPHATE AND BETAMETHASONE ACETATE 9 MG: 3; 3 INJECTION, SUSPENSION INTRA-ARTICULAR; INTRALESIONAL; INTRAMUSCULAR; SOFT TISSUE at 03:11

## 2018-11-25 RX ADMIN — KETOROLAC TROMETHAMINE 30 MG: 30 INJECTION, SOLUTION INTRAMUSCULAR; INTRAVENOUS at 03:11

## 2018-11-25 NOTE — PATIENT INSTRUCTIONS
Do not take Flexeril and drive     Please drink plenty of fluids.  Please get plenty of rest.  Please return here or go to the Emergency Department for any concerns or worsening of condition.  If you were prescribed antibiotics, please take them to completion.  If you were given wait & see antibiotics, please wait 5-7 days before taking them, and only take them if your symptoms have worsened or not improved.  If you do begin taking the antibiotics, please take them to completion.  If you were prescribed a narcotic medication, do not drive or operate heavy equipment or machinery while taking these medications.  If you were given a steroid shot in the clinic and have also been given a prescription for a steroid such as Prednisone or a Medrol Dose Pack, please begin taking them tomorrow.  If you do not have Hypertension or any history of palpitations, it is ok to take over the counter Sudafed or Mucinex D or Allegra-D or Claritin-D or Zyrtec-D.  If you do take one of the above, it is ok to combine that with plain over the counter Mucinex or Allegra or Claritin or Zyrtec.  If for example you are taking Zyrtec -D, you can combine that with Mucinex, but not Mucinex-D.  If you are taking Mucinex-D, you can combine that with plain Allegra or Claritin or Zyrtec.   Flonase   If you do have Hypertension or palpitations, it is safe to take Coricidin HBP for relief of sinus symptoms.  If not allergic, please take over the counter Tylenol (Acetaminophen) and/or Motrin (Ibuprofen) as directed for control of pain and/or fever.  Please follow up with your primary care doctor or specialist as needed.    If you  smoke, please stop smoking.      Viral Upper Respiratory Illness (Adult)  You have a viral upper respiratory illness (URI), which is another term for the common cold. This illness is contagious during the first few days. It is spread through the air by coughing and sneezing. It may also be spread by direct contact (touching  the sick person and then touching your own eyes, nose, or mouth). Frequent handwashing will decrease risk of spread. Most viral illnesses go away within 7 to 10 days with rest and simple home remedies. Sometimes the illness may last for several weeks. Antibiotics will not kill a virus, and they are generally not prescribed for this condition.    Home care  · If symptoms are severe, rest at home for the first 2 to 3 days. When you resume activity, don't let yourself get too tired.  · Avoid being exposed to cigarette smoke (yours or others).  · You may use acetaminophen or ibuprofen to control pain and fever, unless another medicine was prescribed. (Note: If you have chronic liver or kidney disease, have ever had a stomach ulcer or gastrointestinal bleeding, or are taking blood-thinning medicines, talk with your healthcare provider before using these medicines.) Aspirin should never be given to anyone under 18 years of age who is ill with a viral infection or fever. It may cause severe liver or brain damage.  · Your appetite may be poor, so a light diet is fine. Avoid dehydration by drinking 6 to 8 glasses of fluids per day (water, soft drinks, juices, tea, or soup). Extra fluids will help loosen secretions in the nose and lungs.  · Over-the-counter cold medicines will not shorten the length of time youre sick, but they may be helpful for the following symptoms: cough, sore throat, and nasal and sinus congestion. (Note: Do not use decongestants if you have high blood pressure.)  Follow-up care  Follow up with your healthcare provider, or as advised.  When to seek medical advice  Call your healthcare provider right away if any of these occur:  · Cough with lots of colored sputum (mucus)  · Severe headache; face, neck, or ear pain  · Difficulty swallowing due to throat pain  · Fever of 100.4°F (38°C)  Call 911, or get immediate medical care  Call emergency services right away if any of these occur:  · Chest pain,  shortness of breath, wheezing, or difficulty breathing  · Coughing up blood  · Inability to swallow due to throat pain  Date Last Reviewed: 9/13/2015 © 2000-2017 [employer]. [address] [place] [address], [place] [address]. All rights reserved. This information is not intended as a substitute for professional medical care. Always follow your healthcare professional's instructions.        Back Spasm (No Trauma)    Spasm of the back muscles can occur after a sudden forceful twisting or bending force (such as in a car accident), after a simple awkward movement, or after lifting something heavy with poor body positioning. In any case, muscle spasm adds to the pain. Sleeping in an awkward position or on a poor quality mattress can also cause this. Some people respond to emotional stress by tensing the muscles of their back.  Pain that continues may need further evaluation or other types of treatment such as physical therapy.  You don't always need X-rays for the initial evaluation of back pain, unless you had a physical injury such as from a car accident or fall. If your pain continues and doesn't respond to medical treatment, X-rays and other tests may then be done.   Home care  · As soon as possible, start sitting or walking again to avoid problems from prolonged bed rest (muscle weakness, worsening back stiffness and pain, blood clots in the legs).  · When in bed, try to find a position of comfort. A firm mattress is best. Try lying flat on your back with pillows under your knees. You can also try lying on your side with your knees bent up toward your chest and a pillow between your knees.  · Avoid prolonged sitting, long car rides, or travel. This puts more stress on the lower back than standing or walking.   · During the first 24 to 72 hours after an injury or flare-up, apply an ice pack to the painful area for 20 minutes, then remove it for 20 minutes. Do this over a period of 60 to 90 minutes or several  times a day. This will reduce swelling and pain. Always wrap ice packs in a thin towel.  · You can start with ice, then switch to heat. Heat (hot shower, hot bath, or heating pad) reduces pain, and works well for muscle spasms. Apply heat to the painful area for 20 minutes, then remove it for 20 minutes. Do this over a period of 60 to 90 minutes or several times a day. Do not sleep on a heating pad as it can burn or damage skin.  · Alternate ice and heat therapies.  · Be aware of safe lifting methods and do not lift anything over 15 pounds until all the pain is gone.  Gentle stretching will help your back heal faster. Do this simple routine 2 to 3 times a day until your back is feeling better.  · Lie on your back with your knees bent and both feet on the ground  · Slowly raise your left knee to your chest as you flatten your lower back against the floor. Hold for 20 to 30 seconds.  · Relax and repeat the exercise with your right knee.  · Do 2 to 3 of these exercises for each leg.  · Repeat, hugging both knees to your chest at the same time.  · Do not bounce, but use a gentle pull.  Medicines  Talk to your doctor before using medicine, especially if you have other medical problems or are taking other medicines.  You may use acetaminophen or ibuprofen to control pain, unless your healthcare provider prescribed another pain medicine. If you have a chronic condition such as diabetes, liver or kidney disease, stomach ulcer, or gastrointestinal bleeding, or are taking blood thinners, talk with your healthcare provider before taking any medicines.  Be careful if you are given prescription pain medicine, narcotics, or medicine for muscle spasm. They can cause drowsiness, affect your coordination, reflexes, or judgment. Do not drive or operate heavy machinery when taking these medicines. Take pain medicine only as prescribed by your healthcare provider.  Follow-up care  Follow up with your doctor, or as advised. Physical  therapy or further tests may be needed.  If X-rays were taken, they may be reviewed by a radiologist. You will be notified of any new findings that may affect your care.  Call 911  Seek emergency medical care if any of these occur:  · Trouble breathing  · Confusion  · Drowsiness or trouble awakening  · Fainting or loss of consciousness  · Rapid or very slow heart rate  · Loss of bowel or bladder control  When to seek medical advice  Call your healthcare provider right away if any of these occur:  · Pain becomes worse or spreads to your legs  · Weakness or numbness in one or both legs  · Numbness in the groin or genital area  · Unexplained fever over 100.4ºF (38.0ºC)  · Burning or pain when passing urine  Date Last Reviewed: 6/1/2016  © 6432-6270 [employer]. [address] [place] [address], [place] [address]. All rights reserved. This information is not intended as a substitute for professional medical care. Always follow your healthcare professional's instructions.

## 2018-11-25 NOTE — PROGRESS NOTES
"Subjective:       Patient ID: Fabian Ivy is a 58 y.o. male.    Vitals:  height is 5' 11" (1.803 m) and weight is 93 kg (205 lb). His oral temperature is 98.5 °F (36.9 °C). His blood pressure is 121/79 and his pulse is 63. His respiration is 16 and oxygen saturation is 98%.     Chief Complaint: Neck Pain and Nasal Congestion    Onset from Thursday for neck pain.  Hx Herniated disc. Cold symptoms are cough, sputum production, headache and sinus pressure.     Patient complaining of onset of cold like symptoms started on Thursday evening.  He denies any fever but reports he does feel flu-like.  He has a history of herniated discs in his neck and reports that he began having right sided neck pain and muscle spasms on Thursday as well.  He has hydrocodone at home he reports no relief.  He is currently out of his muscle relaxants.      Neck Pain    This is a chronic problem. The current episode started in the past 7 days. The problem occurs constantly. The problem has been unchanged. The pain is associated with an unknown factor. The pain is at a severity of 7/10. The pain is moderate. The pain is same all the time. Stiffness is present all day. Pertinent negatives include no chest pain, fever or headaches.       Constitution: Negative for chills, fatigue and fever.   HENT: Positive for congestion. Negative for sore throat.    Neck: Positive for neck pain. Negative for painful lymph nodes.   Cardiovascular: Negative for chest pain and leg swelling.   Eyes: Negative for double vision and blurred vision.   Respiratory: Positive for cough and sputum production. Negative for shortness of breath.    Gastrointestinal: Negative for nausea, vomiting and diarrhea.   Genitourinary: Negative for dysuria, frequency and urgency.   Musculoskeletal: Positive for muscle ache. Negative for joint pain, joint swelling and muscle cramps.   Skin: Negative for color change, pale and rash.   Allergic/Immunologic: Negative for seasonal " allergies.   Neurological: Negative for dizziness, history of vertigo, light-headedness, passing out and headaches.   Hematologic/Lymphatic: Negative for swollen lymph nodes, easy bruising/bleeding and history of blood clots. Does not bruise/bleed easily.   Psychiatric/Behavioral: Negative for nervous/anxious, sleep disturbance and depression. The patient is not nervous/anxious.        Objective:      Physical Exam   Constitutional: He is oriented to person, place, and time. He appears well-developed and well-nourished. He is cooperative.  Non-toxic appearance. He does not appear ill. No distress.   NAD  Afebrile    HENT:   Head: Normocephalic and atraumatic.   Right Ear: Hearing, tympanic membrane, external ear and ear canal normal.   Left Ear: Hearing, tympanic membrane, external ear and ear canal normal.   Nose: Mucosal edema and rhinorrhea present. No sinus tenderness or nasal deformity. No epistaxis. Right sinus exhibits no maxillary sinus tenderness and no frontal sinus tenderness. Left sinus exhibits no maxillary sinus tenderness and no frontal sinus tenderness.   Mouth/Throat: Uvula is midline and mucous membranes are normal. No trismus in the jaw. Normal dentition. No uvula swelling. Posterior oropharyngeal edema and posterior oropharyngeal erythema present. No oropharyngeal exudate or tonsillar abscesses.   Cobblestoning to posterior pharynx with clear PND    Eyes: Conjunctivae, EOM and lids are normal. Pupils are equal, round, and reactive to light. No scleral icterus.   Sclera clear bilat   Neck: Trachea normal, normal range of motion, full passive range of motion without pain and phonation normal. Neck supple. Muscular tenderness present. No spinous process tenderness present. No neck rigidity. No edema, no erythema and normal range of motion present. No Brudzinski's sign and no Kernig's sign noted.       Cardiovascular: Normal rate, regular rhythm, normal heart sounds, intact distal pulses and normal  pulses.   Pulmonary/Chest: Effort normal and breath sounds normal. No accessory muscle usage or stridor. No tachypnea. No respiratory distress. He has no decreased breath sounds. He has no wheezes. He has no rhonchi. He has no rales.   Abdominal: Soft. Normal appearance and bowel sounds are normal. He exhibits no distension. There is no tenderness.   Musculoskeletal: Normal range of motion. He exhibits no edema or deformity.   Lymphadenopathy:        Head (right side): Submandibular adenopathy present. No tonsillar adenopathy present.        Head (left side): Submandibular adenopathy present. No tonsillar adenopathy present.     He has no cervical adenopathy.   Neurological: He is alert and oriented to person, place, and time. He exhibits normal muscle tone. Coordination normal.   Skin: Skin is warm, dry and intact. He is not diaphoretic. No pallor.   Psychiatric: He has a normal mood and affect. His speech is normal and behavior is normal. Judgment and thought content normal. Cognition and memory are normal.   Nursing note and vitals reviewed.        Results for orders placed or performed in visit on 11/25/18   POCT Influenza A/B   Result Value Ref Range    Rapid Influenza A Ag Negative Negative    Rapid Influenza B Ag Negative Negative     Acceptable Yes        Assessment:       1. URI with cough and congestion    2. History of herniated intervertebral disc    3. Muscle spasms of neck        Plan:         URI with cough and congestion  -     POCT Influenza A/B  -     fluticasone (FLONASE) 50 mcg/actuation nasal spray; 2 sprays (100 mcg total) by Each Nare route once daily.  Dispense: 1 Bottle; Refill: 0  -     betamethasone acetate-betamethasone sodium phosphate injection 9 mg  -     benzonatate (TESSALON PERLES) 100 MG capsule; Take 2 capsules (200 mg total) by mouth 3 (three) times daily as needed for Cough.  Dispense: 30 capsule; Refill: 0    History of herniated intervertebral disc  -      ketorolac injection 30 mg    Muscle spasms of neck  -     ketorolac injection 30 mg  -     cyclobenzaprine (FLEXERIL) 10 MG tablet; Take 1 tablet (10 mg total) by mouth 3 (three) times daily as needed for Muscle spasms.  Dispense: 30 tablet; Refill: 0    Discussed with patient that this is likely viral. OTC agents for symptom relief reviewed.   He has hydrocodone at home for pain and it etodolac.   He needs a refill on his Flexeril        Patient Instructions   Do not take Flexeril and drive     Please drink plenty of fluids.  Please get plenty of rest.  Please return here or go to the Emergency Department for any concerns or worsening of condition.  If you were prescribed antibiotics, please take them to completion.  If you were given wait & see antibiotics, please wait 5-7 days before taking them, and only take them if your symptoms have worsened or not improved.  If you do begin taking the antibiotics, please take them to completion.  If you were prescribed a narcotic medication, do not drive or operate heavy equipment or machinery while taking these medications.  If you were given a steroid shot in the clinic and have also been given a prescription for a steroid such as Prednisone or a Medrol Dose Pack, please begin taking them tomorrow.  If you do not have Hypertension or any history of palpitations, it is ok to take over the counter Sudafed or Mucinex D or Allegra-D or Claritin-D or Zyrtec-D.  If you do take one of the above, it is ok to combine that with plain over the counter Mucinex or Allegra or Claritin or Zyrtec.  If for example you are taking Zyrtec -D, you can combine that with Mucinex, but not Mucinex-D.  If you are taking Mucinex-D, you can combine that with plain Allegra or Claritin or Zyrtec.   Flonase   If you do have Hypertension or palpitations, it is safe to take Coricidin HBP for relief of sinus symptoms.  If not allergic, please take over the counter Tylenol (Acetaminophen) and/or Motrin  (Ibuprofen) as directed for control of pain and/or fever.  Please follow up with your primary care doctor or specialist as needed.    If you  smoke, please stop smoking.      Viral Upper Respiratory Illness (Adult)  You have a viral upper respiratory illness (URI), which is another term for the common cold. This illness is contagious during the first few days. It is spread through the air by coughing and sneezing. It may also be spread by direct contact (touching the sick person and then touching your own eyes, nose, or mouth). Frequent handwashing will decrease risk of spread. Most viral illnesses go away within 7 to 10 days with rest and simple home remedies. Sometimes the illness may last for several weeks. Antibiotics will not kill a virus, and they are generally not prescribed for this condition.    Home care  · If symptoms are severe, rest at home for the first 2 to 3 days. When you resume activity, don't let yourself get too tired.  · Avoid being exposed to cigarette smoke (yours or others).  · You may use acetaminophen or ibuprofen to control pain and fever, unless another medicine was prescribed. (Note: If you have chronic liver or kidney disease, have ever had a stomach ulcer or gastrointestinal bleeding, or are taking blood-thinning medicines, talk with your healthcare provider before using these medicines.) Aspirin should never be given to anyone under 18 years of age who is ill with a viral infection or fever. It may cause severe liver or brain damage.  · Your appetite may be poor, so a light diet is fine. Avoid dehydration by drinking 6 to 8 glasses of fluids per day (water, soft drinks, juices, tea, or soup). Extra fluids will help loosen secretions in the nose and lungs.  · Over-the-counter cold medicines will not shorten the length of time youre sick, but they may be helpful for the following symptoms: cough, sore throat, and nasal and sinus congestion. (Note: Do not use decongestants if you have  high blood pressure.)  Follow-up care  Follow up with your healthcare provider, or as advised.  When to seek medical advice  Call your healthcare provider right away if any of these occur:  · Cough with lots of colored sputum (mucus)  · Severe headache; face, neck, or ear pain  · Difficulty swallowing due to throat pain  · Fever of 100.4°F (38°C)  Call 911, or get immediate medical care  Call emergency services right away if any of these occur:  · Chest pain, shortness of breath, wheezing, or difficulty breathing  · Coughing up blood  · Inability to swallow due to throat pain  Date Last Reviewed: 9/13/2015  © 1927-0629 [employer]. [address] [place] [address], [place] [address]. All rights reserved. This information is not intended as a substitute for professional medical care. Always follow your healthcare professional's instructions.        Back Spasm (No Trauma)    Spasm of the back muscles can occur after a sudden forceful twisting or bending force (such as in a car accident), after a simple awkward movement, or after lifting something heavy with poor body positioning. In any case, muscle spasm adds to the pain. Sleeping in an awkward position or on a poor quality mattress can also cause this. Some people respond to emotional stress by tensing the muscles of their back.  Pain that continues may need further evaluation or other types of treatment such as physical therapy.  You don't always need X-rays for the initial evaluation of back pain, unless you had a physical injury such as from a car accident or fall. If your pain continues and doesn't respond to medical treatment, X-rays and other tests may then be done.   Home care  · As soon as possible, start sitting or walking again to avoid problems from prolonged bed rest (muscle weakness, worsening back stiffness and pain, blood clots in the legs).  · When in bed, try to find a position of comfort. A firm mattress is best. Try lying flat on your back  with pillows under your knees. You can also try lying on your side with your knees bent up toward your chest and a pillow between your knees.  · Avoid prolonged sitting, long car rides, or travel. This puts more stress on the lower back than standing or walking.   · During the first 24 to 72 hours after an injury or flare-up, apply an ice pack to the painful area for 20 minutes, then remove it for 20 minutes. Do this over a period of 60 to 90 minutes or several times a day. This will reduce swelling and pain. Always wrap ice packs in a thin towel.  · You can start with ice, then switch to heat. Heat (hot shower, hot bath, or heating pad) reduces pain, and works well for muscle spasms. Apply heat to the painful area for 20 minutes, then remove it for 20 minutes. Do this over a period of 60 to 90 minutes or several times a day. Do not sleep on a heating pad as it can burn or damage skin.  · Alternate ice and heat therapies.  · Be aware of safe lifting methods and do not lift anything over 15 pounds until all the pain is gone.  Gentle stretching will help your back heal faster. Do this simple routine 2 to 3 times a day until your back is feeling better.  · Lie on your back with your knees bent and both feet on the ground  · Slowly raise your left knee to your chest as you flatten your lower back against the floor. Hold for 20 to 30 seconds.  · Relax and repeat the exercise with your right knee.  · Do 2 to 3 of these exercises for each leg.  · Repeat, hugging both knees to your chest at the same time.  · Do not bounce, but use a gentle pull.  Medicines  Talk to your doctor before using medicine, especially if you have other medical problems or are taking other medicines.  You may use acetaminophen or ibuprofen to control pain, unless your healthcare provider prescribed another pain medicine. If you have a chronic condition such as diabetes, liver or kidney disease, stomach ulcer, or gastrointestinal bleeding, or are  taking blood thinners, talk with your healthcare provider before taking any medicines.  Be careful if you are given prescription pain medicine, narcotics, or medicine for muscle spasm. They can cause drowsiness, affect your coordination, reflexes, or judgment. Do not drive or operate heavy machinery when taking these medicines. Take pain medicine only as prescribed by your healthcare provider.  Follow-up care  Follow up with your doctor, or as advised. Physical therapy or further tests may be needed.  If X-rays were taken, they may be reviewed by a radiologist. You will be notified of any new findings that may affect your care.  Call 911  Seek emergency medical care if any of these occur:  · Trouble breathing  · Confusion  · Drowsiness or trouble awakening  · Fainting or loss of consciousness  · Rapid or very slow heart rate  · Loss of bowel or bladder control  When to seek medical advice  Call your healthcare provider right away if any of these occur:  · Pain becomes worse or spreads to your legs  · Weakness or numbness in one or both legs  · Numbness in the groin or genital area  · Unexplained fever over 100.4ºF (38.0ºC)  · Burning or pain when passing urine  Date Last Reviewed: 6/1/2016  © 3112-0930 [email or web address]. [address] [place] [address], [place] [address]. All rights reserved. This information is not intended as a substitute for professional medical care. Always follow your healthcare professional's instructions.

## 2018-12-31 ENCOUNTER — PATIENT MESSAGE (OUTPATIENT)
Dept: INTERNAL MEDICINE | Facility: CLINIC | Age: 58
End: 2018-12-31

## 2019-01-05 DIAGNOSIS — J06.9 URI WITH COUGH AND CONGESTION: ICD-10-CM

## 2019-01-06 RX ORDER — FLUTICASONE PROPIONATE 50 MCG
SPRAY, SUSPENSION (ML) NASAL
Qty: 16 ML | Refills: 0 | OUTPATIENT
Start: 2019-01-06

## 2019-05-06 ENCOUNTER — LAB VISIT (OUTPATIENT)
Dept: LAB | Facility: OTHER | Age: 59
End: 2019-05-06
Attending: INTERNAL MEDICINE
Payer: COMMERCIAL

## 2019-05-06 ENCOUNTER — OFFICE VISIT (OUTPATIENT)
Dept: INTERNAL MEDICINE | Facility: CLINIC | Age: 59
End: 2019-05-06
Attending: INTERNAL MEDICINE
Payer: COMMERCIAL

## 2019-05-06 VITALS
DIASTOLIC BLOOD PRESSURE: 92 MMHG | HEIGHT: 71 IN | BODY MASS INDEX: 29.94 KG/M2 | WEIGHT: 213.88 LBS | SYSTOLIC BLOOD PRESSURE: 160 MMHG

## 2019-05-06 DIAGNOSIS — Z00.00 ANNUAL PHYSICAL EXAM: ICD-10-CM

## 2019-05-06 DIAGNOSIS — M25.552 BILATERAL HIP PAIN: ICD-10-CM

## 2019-05-06 DIAGNOSIS — Z00.00 ANNUAL PHYSICAL EXAM: Primary | ICD-10-CM

## 2019-05-06 DIAGNOSIS — I10 BENIGN ESSENTIAL HYPERTENSION: ICD-10-CM

## 2019-05-06 DIAGNOSIS — Z87.891 FORMER SMOKER: ICD-10-CM

## 2019-05-06 DIAGNOSIS — M25.551 BILATERAL HIP PAIN: ICD-10-CM

## 2019-05-06 LAB
ALBUMIN SERPL BCP-MCNC: 4.1 G/DL (ref 3.5–5.2)
ALP SERPL-CCNC: 75 U/L (ref 55–135)
ALT SERPL W/O P-5'-P-CCNC: 41 U/L (ref 10–44)
ANION GAP SERPL CALC-SCNC: 10 MMOL/L (ref 8–16)
AST SERPL-CCNC: 30 U/L (ref 10–40)
BASOPHILS # BLD AUTO: 0.02 K/UL (ref 0–0.2)
BASOPHILS NFR BLD: 0.5 % (ref 0–1.9)
BILIRUB SERPL-MCNC: 0.4 MG/DL (ref 0.1–1)
BUN SERPL-MCNC: 23 MG/DL (ref 8–23)
CALCIUM SERPL-MCNC: 9.4 MG/DL (ref 8.7–10.5)
CHLORIDE SERPL-SCNC: 107 MMOL/L (ref 95–110)
CHOLEST SERPL-MCNC: 171 MG/DL (ref 120–199)
CHOLEST/HDLC SERPL: 3.1 {RATIO} (ref 2–5)
CO2 SERPL-SCNC: 27 MMOL/L (ref 23–29)
COMPLEXED PSA SERPL-MCNC: 1.2 NG/ML (ref 0–4)
CREAT SERPL-MCNC: 1 MG/DL (ref 0.5–1.4)
DIFFERENTIAL METHOD: ABNORMAL
EOSINOPHIL # BLD AUTO: 0.1 K/UL (ref 0–0.5)
EOSINOPHIL NFR BLD: 3.4 % (ref 0–8)
ERYTHROCYTE [DISTWIDTH] IN BLOOD BY AUTOMATED COUNT: 13.8 % (ref 11.5–14.5)
EST. GFR  (AFRICAN AMERICAN): >60 ML/MIN/1.73 M^2
EST. GFR  (NON AFRICAN AMERICAN): >60 ML/MIN/1.73 M^2
GLUCOSE SERPL-MCNC: 110 MG/DL (ref 70–110)
HCT VFR BLD AUTO: 43.5 % (ref 40–54)
HDLC SERPL-MCNC: 55 MG/DL (ref 40–75)
HDLC SERPL: 32.2 % (ref 20–50)
HGB BLD-MCNC: 14.4 G/DL (ref 14–18)
LDLC SERPL CALC-MCNC: 97 MG/DL (ref 63–159)
LYMPHOCYTES # BLD AUTO: 1.4 K/UL (ref 1–4.8)
LYMPHOCYTES NFR BLD: 36.5 % (ref 18–48)
MCH RBC QN AUTO: 32.1 PG (ref 27–31)
MCHC RBC AUTO-ENTMCNC: 33.1 G/DL (ref 32–36)
MCV RBC AUTO: 97 FL (ref 82–98)
MONOCYTES # BLD AUTO: 0.6 K/UL (ref 0.3–1)
MONOCYTES NFR BLD: 14.7 % (ref 4–15)
NEUTROPHILS # BLD AUTO: 1.7 K/UL (ref 1.8–7.7)
NEUTROPHILS NFR BLD: 44.6 % (ref 38–73)
NONHDLC SERPL-MCNC: 116 MG/DL
PLATELET # BLD AUTO: 190 K/UL (ref 150–350)
PMV BLD AUTO: 10.2 FL (ref 9.2–12.9)
POTASSIUM SERPL-SCNC: 4.8 MMOL/L (ref 3.5–5.1)
PROT SERPL-MCNC: 7.4 G/DL (ref 6–8.4)
RBC # BLD AUTO: 4.49 M/UL (ref 4.6–6.2)
SODIUM SERPL-SCNC: 144 MMOL/L (ref 136–145)
TRIGL SERPL-MCNC: 95 MG/DL (ref 30–150)
TSH SERPL DL<=0.005 MIU/L-ACNC: 3.63 UIU/ML (ref 0.4–4)
WBC # BLD AUTO: 3.81 K/UL (ref 3.9–12.7)

## 2019-05-06 PROCEDURE — 85025 COMPLETE CBC W/AUTO DIFF WBC: CPT

## 2019-05-06 PROCEDURE — 80053 COMPREHEN METABOLIC PANEL: CPT

## 2019-05-06 PROCEDURE — 99999 PR PBB SHADOW E&M-EST. PATIENT-LVL III: CPT | Mod: PBBFAC,,, | Performed by: INTERNAL MEDICINE

## 2019-05-06 PROCEDURE — 84443 ASSAY THYROID STIM HORMONE: CPT

## 2019-05-06 PROCEDURE — 3077F SYST BP >= 140 MM HG: CPT | Mod: CPTII,S$GLB,, | Performed by: INTERNAL MEDICINE

## 2019-05-06 PROCEDURE — 80061 LIPID PANEL: CPT

## 2019-05-06 PROCEDURE — 84153 ASSAY OF PSA TOTAL: CPT

## 2019-05-06 PROCEDURE — 36415 COLL VENOUS BLD VENIPUNCTURE: CPT

## 2019-05-06 PROCEDURE — 3080F DIAST BP >= 90 MM HG: CPT | Mod: CPTII,S$GLB,, | Performed by: INTERNAL MEDICINE

## 2019-05-06 PROCEDURE — 99999 PR PBB SHADOW E&M-EST. PATIENT-LVL III: ICD-10-PCS | Mod: PBBFAC,,, | Performed by: INTERNAL MEDICINE

## 2019-05-06 PROCEDURE — 3077F PR MOST RECENT SYSTOLIC BLOOD PRESSURE >= 140 MM HG: ICD-10-PCS | Mod: CPTII,S$GLB,, | Performed by: INTERNAL MEDICINE

## 2019-05-06 PROCEDURE — 99396 PR PREVENTIVE VISIT,EST,40-64: ICD-10-PCS | Mod: S$GLB,,, | Performed by: INTERNAL MEDICINE

## 2019-05-06 PROCEDURE — 99396 PREV VISIT EST AGE 40-64: CPT | Mod: S$GLB,,, | Performed by: INTERNAL MEDICINE

## 2019-05-06 PROCEDURE — 3080F PR MOST RECENT DIASTOLIC BLOOD PRESSURE >= 90 MM HG: ICD-10-PCS | Mod: CPTII,S$GLB,, | Performed by: INTERNAL MEDICINE

## 2019-05-06 RX ORDER — VALSARTAN 40 MG/1
40 TABLET ORAL DAILY
Qty: 90 TABLET | Refills: 3 | Status: SHIPPED | OUTPATIENT
Start: 2019-05-06 | End: 2019-11-06

## 2019-05-06 NOTE — PROGRESS NOTES
"Subjective:       Patient ID: Fabian Ivy is a 59 y.o. male.    Chief Complaint: URI; Back Pain; and Numbness    Here for annual visit      1 month of low back bilateral hip pain that radiates own the sides of his thighs. He has gained about 20lbs in the past 2-3 months. He is off his diet and not exercising as he was before. He is amenable to medication. Denies frequent or current HA, intermittent blurry vision, dizziness, CP, or SOB.         Review of Systems   Constitutional: Negative for appetite change, chills, fever and unexpected weight change.   HENT: Negative for hearing loss, sore throat and trouble swallowing.    Eyes: Negative for visual disturbance.   Respiratory: Negative for cough, chest tightness and shortness of breath.    Cardiovascular: Negative for chest pain and leg swelling.   Gastrointestinal: Negative for abdominal pain, blood in stool, constipation, diarrhea, nausea and vomiting.   Endocrine: Negative for polydipsia and polyuria.   Genitourinary: Negative for decreased urine volume, difficulty urinating, dysuria, frequency and urgency.   Musculoskeletal: Negative for gait problem.   Skin: Negative for rash.   Neurological: Negative for dizziness and numbness.   Psychiatric/Behavioral: The patient is not nervous/anxious.        Objective:      Vitals:    05/06/19 0815   BP: (!) 160/92   Weight: 97 kg (213 lb 13.5 oz)   Height: 5' 11" (1.803 m)      Physical Exam   Constitutional: He is oriented to person, place, and time. He appears well-developed and well-nourished. No distress.   HENT:   Head: Normocephalic and atraumatic.   Mouth/Throat: Oropharynx is clear and moist. No oropharyngeal exudate.   Eyes: Pupils are equal, round, and reactive to light. Conjunctivae and EOM are normal. No scleral icterus.   Neck: No thyromegaly present.   Cardiovascular: Normal rate, regular rhythm and normal heart sounds.   No murmur heard.  Pulmonary/Chest: Effort normal and breath sounds normal. He has no " wheezes. He has no rales.   Abdominal: Soft. He exhibits no distension. There is no tenderness.   Musculoskeletal: He exhibits no edema or tenderness.   Lymphadenopathy:     He has no cervical adenopathy.   Neurological: He is alert and oriented to person, place, and time.   Skin: Skin is warm and dry.   Psychiatric: He has a normal mood and affect. His behavior is normal.       Assessment:       1. Annual physical exam    2. Bilateral hip pain    3. Former smoker    4. Benign essential hypertension        Plan:       Fabian was seen today for uri, back pain and numbness.    Diagnoses and all orders for this visit:    Annual physical exam  -     PSA, Screening; Future  -     Comprehensive metabolic panel; Future  -     Lipid panel; Future  -     TSH; Future  -     CBC auto differential; Future    Bilateral hip pain   Given HEP for IT band    Former smoker   Continued cessation discussed    Benign essential hypertension  -     START valsartan (DIOVAN) 40 MG tablet; Take 1 tablet (40 mg total) by mouth once daily.  f/u in 3-4 weeks for nurse visit for BP check                   Side effects of medication(s) were discussed in detail and patient voiced understanding.  Patient will call back for any issues or complications.

## 2019-05-27 ENCOUNTER — CLINICAL SUPPORT (OUTPATIENT)
Dept: INTERNAL MEDICINE | Facility: CLINIC | Age: 59
End: 2019-05-27
Payer: COMMERCIAL

## 2019-05-27 VITALS — HEART RATE: 80 BPM | DIASTOLIC BLOOD PRESSURE: 76 MMHG | SYSTOLIC BLOOD PRESSURE: 130 MMHG | OXYGEN SATURATION: 98 %

## 2019-05-27 PROCEDURE — 99999 PR PBB SHADOW E&M-EST. PATIENT-LVL II: CPT | Mod: PBBFAC,,,

## 2019-05-27 PROCEDURE — 99999 PR PBB SHADOW E&M-EST. PATIENT-LVL II: ICD-10-PCS | Mod: PBBFAC,,,

## 2019-05-27 NOTE — PROGRESS NOTES
Fabian Ivy 59 y.o. male is here today for Blood Pressure check.   History of HTN yes.    Review of patient's allergies indicates:  No Known Allergies  Creatinine   Date Value Ref Range Status   05/06/2019 1.0 0.5 - 1.4 mg/dL Final     Sodium   Date Value Ref Range Status   05/06/2019 144 136 - 145 mmol/L Final     Potassium   Date Value Ref Range Status   05/06/2019 4.8 3.5 - 5.1 mmol/L Final   ]  Patient verifies taking blood pressure medications on a regular bases at the same time of the day.     Current Outpatient Medications:     etodolac (LODINE XL) 400 MG 24 hr tablet, , Disp: , Rfl: 2    etodolac (LODINE) 400 MG tablet, TK 1 T PO D WF, Disp: , Rfl: 1    hydrocodone-acetaminophen 10-325mg (NORCO)  mg Tab, , Disp: , Rfl:     NICORETTE 4 mg Gum, TAKE 1 GUM BY MOUTH AS NEEDED, Disp: 50 each, Rfl: 0    nicotine polacrilex (NICORETTE) 4 MG Gum, CHEW AS NEEDED, Disp: 170 each, Rfl: 1    valsartan (DIOVAN) 40 MG tablet, Take 1 tablet (40 mg total) by mouth once daily., Disp: 90 tablet, Rfl: 3  Does patient have record of home blood pressure readings yes. Readings have been averaging 132/78.   Last dose of blood pressure medication was taken at 800 am.  Patient is asymptomatic.     BP: 130/76 , Pulse: 80.      Dr. Rice notified.   Pt comes in for bp check and bp is within normal range

## 2019-06-27 ENCOUNTER — TELEPHONE (OUTPATIENT)
Dept: INTERNAL MEDICINE | Facility: CLINIC | Age: 59
End: 2019-06-27

## 2019-06-27 NOTE — TELEPHONE ENCOUNTER
----- Message from Butch Montez sent at 6/27/2019 11:21 AM CDT -----  Contact: Cam Meyer  Name of Who is Calling: Cam Meyer    What is the request in detail: Cam Meyer is requesting a call back regarding a prescription that was faxed over ..... Please contact to further discuss and advise      Can the clinic reply by [facility]: No     What Number to Call Back if not in [facility]:[phone]

## 2019-08-09 ENCOUNTER — PATIENT MESSAGE (OUTPATIENT)
Dept: ADMINISTRATIVE | Facility: HOSPITAL | Age: 59
End: 2019-08-09

## 2019-11-06 ENCOUNTER — OFFICE VISIT (OUTPATIENT)
Dept: INTERNAL MEDICINE | Facility: CLINIC | Age: 59
End: 2019-11-06
Attending: INTERNAL MEDICINE
Payer: COMMERCIAL

## 2019-11-06 VITALS
OXYGEN SATURATION: 97 % | WEIGHT: 215.81 LBS | SYSTOLIC BLOOD PRESSURE: 132 MMHG | BODY MASS INDEX: 30.21 KG/M2 | DIASTOLIC BLOOD PRESSURE: 86 MMHG | HEART RATE: 63 BPM | HEIGHT: 71 IN

## 2019-11-06 DIAGNOSIS — M25.551 BILATERAL HIP PAIN: ICD-10-CM

## 2019-11-06 DIAGNOSIS — I10 BENIGN ESSENTIAL HYPERTENSION: ICD-10-CM

## 2019-11-06 DIAGNOSIS — M25.552 BILATERAL HIP PAIN: ICD-10-CM

## 2019-11-06 DIAGNOSIS — Z87.891 FORMER SMOKER: Primary | ICD-10-CM

## 2019-11-06 PROBLEM — Z72.0 TOBACCO ABUSE: Status: RESOLVED | Noted: 2017-05-23 | Resolved: 2019-11-06

## 2019-11-06 PROCEDURE — 3008F PR BODY MASS INDEX (BMI) DOCUMENTED: ICD-10-PCS | Mod: CPTII,S$GLB,, | Performed by: INTERNAL MEDICINE

## 2019-11-06 PROCEDURE — 99999 PR PBB SHADOW E&M-EST. PATIENT-LVL III: ICD-10-PCS | Mod: PBBFAC,,, | Performed by: INTERNAL MEDICINE

## 2019-11-06 PROCEDURE — 3079F DIAST BP 80-89 MM HG: CPT | Mod: CPTII,S$GLB,, | Performed by: INTERNAL MEDICINE

## 2019-11-06 PROCEDURE — 99214 PR OFFICE/OUTPT VISIT, EST, LEVL IV, 30-39 MIN: ICD-10-PCS | Mod: S$GLB,,, | Performed by: INTERNAL MEDICINE

## 2019-11-06 PROCEDURE — 3075F PR MOST RECENT SYSTOLIC BLOOD PRESS GE 130-139MM HG: ICD-10-PCS | Mod: CPTII,S$GLB,, | Performed by: INTERNAL MEDICINE

## 2019-11-06 PROCEDURE — 3008F BODY MASS INDEX DOCD: CPT | Mod: CPTII,S$GLB,, | Performed by: INTERNAL MEDICINE

## 2019-11-06 PROCEDURE — 99999 PR PBB SHADOW E&M-EST. PATIENT-LVL III: CPT | Mod: PBBFAC,,, | Performed by: INTERNAL MEDICINE

## 2019-11-06 PROCEDURE — 3075F SYST BP GE 130 - 139MM HG: CPT | Mod: CPTII,S$GLB,, | Performed by: INTERNAL MEDICINE

## 2019-11-06 PROCEDURE — 99214 OFFICE O/P EST MOD 30 MIN: CPT | Mod: S$GLB,,, | Performed by: INTERNAL MEDICINE

## 2019-11-06 PROCEDURE — 3079F PR MOST RECENT DIASTOLIC BLOOD PRESSURE 80-89 MM HG: ICD-10-PCS | Mod: CPTII,S$GLB,, | Performed by: INTERNAL MEDICINE

## 2019-11-06 RX ORDER — VALSARTAN 80 MG/1
80 TABLET ORAL DAILY
Qty: 90 TABLET | Refills: 3 | Status: SHIPPED | OUTPATIENT
Start: 2019-11-06 | End: 2020-04-28 | Stop reason: SDUPTHER

## 2019-11-06 NOTE — PROGRESS NOTES
"Subjective:       Patient ID: Fabian Ivy is a 59 y.o. male.    Chief Complaint: Follow-up and Hypertension    Here for routine visit for HTN    Intermittent low back pain, bilateral radiation down buttocks and stops there. Participating in PT once a week. Symptoms overall well controlled and no acute issues.    He continues to be nicotine free.    BP borderline. Will increase valsartan to 80mg. .Denies frequent or current HA, intermittent blurry vision, dizziness, CP, or SOB.        Review of Systems    Objective:      Vitals:    11/06/19 1011   BP: 132/86   Pulse: 63   SpO2: 97%   Weight: 97.9 kg (215 lb 13.3 oz)   Height: 5' 11" (1.803 m)      Physical Exam   Constitutional: He is oriented to person, place, and time. He appears well-developed and well-nourished. No distress.   HENT:   Head: Normocephalic and atraumatic.   Mouth/Throat: Oropharynx is clear and moist. No oropharyngeal exudate.   Eyes: Pupils are equal, round, and reactive to light. Conjunctivae and EOM are normal. No scleral icterus.   Neck: No thyromegaly present.   Cardiovascular: Normal rate, regular rhythm and normal heart sounds.   No murmur heard.  Pulmonary/Chest: Effort normal and breath sounds normal. He has no wheezes. He has no rales.   Abdominal: Soft. He exhibits no distension. There is no tenderness.   Musculoskeletal: He exhibits no edema or tenderness.   Lymphadenopathy:     He has no cervical adenopathy.   Neurological: He is alert and oriented to person, place, and time.   Skin: Skin is warm and dry.   Psychiatric: He has a normal mood and affect. His behavior is normal.       Assessment:       1. Former smoker    2. Benign essential hypertension    3. Bilateral hip pain        Plan:       Fabian was seen today for follow-up and hypertension.    Diagnoses and all orders for this visit:    Former smoker  Comments:  45 +pk yrs. Quit late 2018    Benign essential hypertension  -     INCREASE valsartan (DIOVAN) 80 MG tablet; Take 1 " tablet (80 mg total) by mouth once daily.    Bilateral hip pain   No acute issues. F/u PT    RTC in 6 months or sooner prn              Dieter Singh MD  Internal Medicine-[facility]        Side effects of medication(s) were discussed in detail and patient voiced understanding.  Patient will call back for any issues or complications.

## 2019-11-07 ENCOUNTER — IMMUNIZATION (OUTPATIENT)
Dept: PHARMACY | Facility: CLINIC | Age: 59
End: 2019-11-07
Payer: COMMERCIAL

## 2020-04-28 RX ORDER — VALSARTAN 40 MG/1
TABLET ORAL
Qty: 90 TABLET | Refills: 3 | OUTPATIENT
Start: 2020-04-28

## 2020-04-28 RX ORDER — VALSARTAN 80 MG/1
80 TABLET ORAL DAILY
Qty: 90 TABLET | Refills: 3 | Status: SHIPPED | OUTPATIENT
Start: 2020-04-28 | End: 2021-05-17 | Stop reason: SDUPTHER

## 2020-11-18 ENCOUNTER — LAB VISIT (OUTPATIENT)
Dept: LAB | Facility: OTHER | Age: 60
End: 2020-11-18
Attending: INTERNAL MEDICINE
Payer: COMMERCIAL

## 2020-11-18 ENCOUNTER — TELEPHONE (OUTPATIENT)
Dept: INTERNAL MEDICINE | Facility: CLINIC | Age: 60
End: 2020-11-18

## 2020-11-18 ENCOUNTER — OFFICE VISIT (OUTPATIENT)
Dept: INTERNAL MEDICINE | Facility: CLINIC | Age: 60
End: 2020-11-18
Attending: INTERNAL MEDICINE
Payer: COMMERCIAL

## 2020-11-18 VITALS
OXYGEN SATURATION: 97 % | BODY MASS INDEX: 30.8 KG/M2 | HEART RATE: 71 BPM | HEIGHT: 71 IN | WEIGHT: 220 LBS | SYSTOLIC BLOOD PRESSURE: 132 MMHG | DIASTOLIC BLOOD PRESSURE: 76 MMHG

## 2020-11-18 DIAGNOSIS — M54.2 CHRONIC NECK PAIN: ICD-10-CM

## 2020-11-18 DIAGNOSIS — R21 RASH: ICD-10-CM

## 2020-11-18 DIAGNOSIS — R35.1 NOCTURIA: ICD-10-CM

## 2020-11-18 DIAGNOSIS — I10 BENIGN ESSENTIAL HYPERTENSION: Primary | ICD-10-CM

## 2020-11-18 DIAGNOSIS — G89.29 CHRONIC NECK PAIN: ICD-10-CM

## 2020-11-18 DIAGNOSIS — I10 BENIGN ESSENTIAL HYPERTENSION: ICD-10-CM

## 2020-11-18 DIAGNOSIS — Z12.11 COLON CANCER SCREENING: ICD-10-CM

## 2020-11-18 DIAGNOSIS — Z12.5 PROSTATE CANCER SCREENING: ICD-10-CM

## 2020-11-18 DIAGNOSIS — G47.33 OSA (OBSTRUCTIVE SLEEP APNEA): ICD-10-CM

## 2020-11-18 DIAGNOSIS — Z11.4 SCREENING FOR HIV (HUMAN IMMUNODEFICIENCY VIRUS): ICD-10-CM

## 2020-11-18 LAB
ALBUMIN SERPL BCP-MCNC: 4.2 G/DL (ref 3.5–5.2)
ALP SERPL-CCNC: 67 U/L (ref 55–135)
ALT SERPL W/O P-5'-P-CCNC: 23 U/L (ref 10–44)
ANION GAP SERPL CALC-SCNC: 10 MMOL/L (ref 8–16)
AST SERPL-CCNC: 20 U/L (ref 10–40)
BASOPHILS # BLD AUTO: 0.05 K/UL (ref 0–0.2)
BASOPHILS NFR BLD: 1 % (ref 0–1.9)
BILIRUB SERPL-MCNC: 0.3 MG/DL (ref 0.1–1)
BUN SERPL-MCNC: 15 MG/DL (ref 8–23)
CALCIUM SERPL-MCNC: 9.3 MG/DL (ref 8.7–10.5)
CHLORIDE SERPL-SCNC: 105 MMOL/L (ref 95–110)
CHOLEST SERPL-MCNC: 194 MG/DL (ref 120–199)
CHOLEST/HDLC SERPL: 4.2 {RATIO} (ref 2–5)
CO2 SERPL-SCNC: 25 MMOL/L (ref 23–29)
CREAT SERPL-MCNC: 0.9 MG/DL (ref 0.5–1.4)
DIFFERENTIAL METHOD: ABNORMAL
EOSINOPHIL # BLD AUTO: 0.1 K/UL (ref 0–0.5)
EOSINOPHIL NFR BLD: 1.9 % (ref 0–8)
ERYTHROCYTE [DISTWIDTH] IN BLOOD BY AUTOMATED COUNT: 12.5 % (ref 11.5–14.5)
EST. GFR  (AFRICAN AMERICAN): >60 ML/MIN/1.73 M^2
EST. GFR  (NON AFRICAN AMERICAN): >60 ML/MIN/1.73 M^2
ESTIMATED AVG GLUCOSE: 128 MG/DL (ref 68–131)
GLUCOSE SERPL-MCNC: 121 MG/DL (ref 70–110)
HBA1C MFR BLD HPLC: 6.1 % (ref 4–5.6)
HCT VFR BLD AUTO: 42.3 % (ref 40–54)
HDLC SERPL-MCNC: 46 MG/DL (ref 40–75)
HDLC SERPL: 23.7 % (ref 20–50)
HGB BLD-MCNC: 14.1 G/DL (ref 14–18)
IMM GRANULOCYTES # BLD AUTO: 0.01 K/UL (ref 0–0.04)
IMM GRANULOCYTES NFR BLD AUTO: 0.2 % (ref 0–0.5)
LDLC SERPL CALC-MCNC: 121.8 MG/DL (ref 63–159)
LYMPHOCYTES # BLD AUTO: 2 K/UL (ref 1–4.8)
LYMPHOCYTES NFR BLD: 37.5 % (ref 18–48)
MCH RBC QN AUTO: 32 PG (ref 27–31)
MCHC RBC AUTO-ENTMCNC: 33.3 G/DL (ref 32–36)
MCV RBC AUTO: 96 FL (ref 82–98)
MONOCYTES # BLD AUTO: 0.5 K/UL (ref 0.3–1)
MONOCYTES NFR BLD: 9.6 % (ref 4–15)
NEUTROPHILS # BLD AUTO: 2.6 K/UL (ref 1.8–7.7)
NEUTROPHILS NFR BLD: 49.8 % (ref 38–73)
NONHDLC SERPL-MCNC: 148 MG/DL
NRBC BLD-RTO: 0 /100 WBC
PLATELET # BLD AUTO: 235 K/UL (ref 150–350)
PMV BLD AUTO: 10.8 FL (ref 9.2–12.9)
POTASSIUM SERPL-SCNC: 4.4 MMOL/L (ref 3.5–5.1)
PROSTATE SPECIFIC ANTIGEN, TOTAL: 1 NG/ML (ref 0–4)
PROT SERPL-MCNC: 7.4 G/DL (ref 6–8.4)
PSA FREE MFR SERPL: 31 %
PSA FREE SERPL-MCNC: 0.31 NG/ML (ref 0.01–1.5)
RBC # BLD AUTO: 4.41 M/UL (ref 4.6–6.2)
SODIUM SERPL-SCNC: 140 MMOL/L (ref 136–145)
TRIGL SERPL-MCNC: 131 MG/DL (ref 30–150)
TSH SERPL DL<=0.005 MIU/L-ACNC: 2.03 UIU/ML (ref 0.4–4)
WBC # BLD AUTO: 5.23 K/UL (ref 3.9–12.7)

## 2020-11-18 PROCEDURE — 99999 PR PBB SHADOW E&M-EST. PATIENT-LVL IV: ICD-10-PCS | Mod: PBBFAC,,, | Performed by: INTERNAL MEDICINE

## 2020-11-18 PROCEDURE — 84443 ASSAY THYROID STIM HORMONE: CPT

## 2020-11-18 PROCEDURE — 99999 PR PBB SHADOW E&M-EST. PATIENT-LVL IV: CPT | Mod: PBBFAC,,, | Performed by: INTERNAL MEDICINE

## 2020-11-18 PROCEDURE — 3078F PR MOST RECENT DIASTOLIC BLOOD PRESSURE < 80 MM HG: ICD-10-PCS | Mod: CPTII,S$GLB,, | Performed by: INTERNAL MEDICINE

## 2020-11-18 PROCEDURE — 80053 COMPREHEN METABOLIC PANEL: CPT

## 2020-11-18 PROCEDURE — 1126F AMNT PAIN NOTED NONE PRSNT: CPT | Mod: S$GLB,,, | Performed by: INTERNAL MEDICINE

## 2020-11-18 PROCEDURE — 3078F DIAST BP <80 MM HG: CPT | Mod: CPTII,S$GLB,, | Performed by: INTERNAL MEDICINE

## 2020-11-18 PROCEDURE — 83036 HEMOGLOBIN GLYCOSYLATED A1C: CPT

## 2020-11-18 PROCEDURE — 36415 COLL VENOUS BLD VENIPUNCTURE: CPT

## 2020-11-18 PROCEDURE — 3008F BODY MASS INDEX DOCD: CPT | Mod: CPTII,S$GLB,, | Performed by: INTERNAL MEDICINE

## 2020-11-18 PROCEDURE — 99214 OFFICE O/P EST MOD 30 MIN: CPT | Mod: S$GLB,,, | Performed by: INTERNAL MEDICINE

## 2020-11-18 PROCEDURE — 86703 HIV-1/HIV-2 1 RESULT ANTBDY: CPT

## 2020-11-18 PROCEDURE — 1126F PR PAIN SEVERITY QUANTIFIED, NO PAIN PRESENT: ICD-10-PCS | Mod: S$GLB,,, | Performed by: INTERNAL MEDICINE

## 2020-11-18 PROCEDURE — 84154 ASSAY OF PSA FREE: CPT

## 2020-11-18 PROCEDURE — 3075F SYST BP GE 130 - 139MM HG: CPT | Mod: CPTII,S$GLB,, | Performed by: INTERNAL MEDICINE

## 2020-11-18 PROCEDURE — 80061 LIPID PANEL: CPT

## 2020-11-18 PROCEDURE — 85025 COMPLETE CBC W/AUTO DIFF WBC: CPT

## 2020-11-18 PROCEDURE — 3008F PR BODY MASS INDEX (BMI) DOCUMENTED: ICD-10-PCS | Mod: CPTII,S$GLB,, | Performed by: INTERNAL MEDICINE

## 2020-11-18 PROCEDURE — 3075F PR MOST RECENT SYSTOLIC BLOOD PRESS GE 130-139MM HG: ICD-10-PCS | Mod: CPTII,S$GLB,, | Performed by: INTERNAL MEDICINE

## 2020-11-18 PROCEDURE — 99214 PR OFFICE/OUTPT VISIT, EST, LEVL IV, 30-39 MIN: ICD-10-PCS | Mod: S$GLB,,, | Performed by: INTERNAL MEDICINE

## 2020-11-18 RX ORDER — INFLUENZA A VIRUS A/VICTORIA/2454/2019 IVR-207 (H1N1) ANTIGEN (PROPIOLACTONE INACTIVATED), INFLUENZA A VIRUS A/HONG KONG/2671/2019 IVR-208 (H3N2) ANTIGEN (PROPIOLACTONE INACTIVATED), INFLUENZA B VIRUS B/VICTORIA/705/2018 BVR-11 ANTIGEN (PROPIOLACTONE INACTIVATED), INFLUENZA B VIRUS B/PHUKET/3073/2013 BVR-1B ANTIGEN (PROPIOLACTONE INACTIVATED) 15; 15; 15; 15 UG/.5ML; UG/.5ML; UG/.5ML; UG/.5ML
INJECTION, SUSPENSION INTRAMUSCULAR
COMMUNITY
Start: 2020-09-11 | End: 2023-01-19

## 2020-11-18 RX ORDER — KETOCONAZOLE 20 MG/G
CREAM TOPICAL 2 TIMES DAILY
Qty: 15 G | Refills: 0 | Status: SHIPPED | OUTPATIENT
Start: 2020-11-18 | End: 2021-01-07 | Stop reason: SDUPTHER

## 2020-11-18 NOTE — PROGRESS NOTES
"Subjective:       Patient ID: Fabian Ivy is a 60 y.o. male.    Chief Complaint: Annual Exam    Here for annual exam    Patient presents today for routine evaluation, physical, and labs. Patient has no major concerns or complaints today.     Gaining weight during pandemic.     ROS + for nocturia 1- 2 times, mild decrease force of stream. He denies urinary frequency, urinary urgency, incomplete emptying of bladder, post void dribble, or gross hematuria.         Review of Systems   Constitutional: Negative for appetite change, chills, fever and unexpected weight change.   HENT: Negative for hearing loss, sore throat and trouble swallowing.    Eyes: Negative for visual disturbance.   Respiratory: Negative for cough, chest tightness and shortness of breath.    Cardiovascular: Negative for chest pain and leg swelling.   Gastrointestinal: Negative for abdominal pain, blood in stool, constipation, diarrhea, nausea and vomiting.   Endocrine: Negative for polydipsia and polyuria.   Genitourinary: Negative for decreased urine volume, difficulty urinating, dysuria, frequency and urgency.   Musculoskeletal: Negative for gait problem.   Skin: Negative for rash.   Neurological: Negative for dizziness and numbness.   Psychiatric/Behavioral: The patient is not nervous/anxious.        Objective:      Vitals:    11/18/20 0936   BP: 132/76   Pulse: 71   SpO2: 97%   Weight: 99.8 kg (220 lb 0.3 oz)   Height: 5' 11" (1.803 m)      Physical Exam  Constitutional:       General: He is not in acute distress.     Appearance: He is well-developed.   HENT:      Head: Normocephalic and atraumatic.      Mouth/Throat:      Pharynx: No oropharyngeal exudate.   Eyes:      General: No scleral icterus.     Conjunctiva/sclera: Conjunctivae normal.      Pupils: Pupils are equal, round, and reactive to light.   Neck:      Thyroid: No thyromegaly.   Cardiovascular:      Rate and Rhythm: Normal rate and regular rhythm.      Heart sounds: Normal heart " sounds. No murmur.   Pulmonary:      Effort: Pulmonary effort is normal.      Breath sounds: Normal breath sounds. No wheezing or rales.   Abdominal:      General: There is no distension.      Palpations: Abdomen is soft.      Tenderness: There is no abdominal tenderness.   Musculoskeletal:         General: No tenderness.   Lymphadenopathy:      Cervical: No cervical adenopathy.   Skin:     General: Skin is warm and dry.   Neurological:      Mental Status: He is alert and oriented to person, place, and time.   Psychiatric:         Behavior: Behavior normal.         Assessment:       1. Benign essential hypertension    2. TORY (obstructive sleep apnea)    3. Chronic neck pain    4. Nocturia    5. Screening for HIV (human immunodeficiency virus)    6. Prostate cancer screening    7. Colon cancer screening    8. Rash        Plan:       Fabian was seen today for annual exam.    Diagnoses and all orders for this visit:    Benign essential hypertension  -     Comprehensive Metabolic Panel; Future  -     Lipid Panel; Future  -     TSH; Future  -     CBC Auto Differential; Future  -     Hemoglobin A1C; Future    TORY (obstructive sleep apnea)  -     Ambulatory referral/consult to Sleep Disorders; Future    Chronic neck pain   No red flags on chronic Hx or acute Hx. No s/s on exam to suggest emergent evaluation needed. Common pathologies of recurrent MSK discussed. Common triggers discussed. Common OTC and Rx modalities discussed. Discussed and/or given HEP.     Screening for HIV (human immunodeficiency virus)  -     HIV 1/2 Ag/Ab (4th Gen); Future    Prostate cancer screening  -     PSA, Total and Free; Future    Colon cancer screening  -     Ambulatory referral/consult to Gastroenterology; Future  Rash    -     ketoconazole (NIZORAL) 2 % cream; Apply topically 2 (two) times daily. Use for minimum of 2 weeks           Dieter Singh MD  Internal Medicine-[facility]        Side effects of medication(s) were discussed in  detail and patient voiced understanding.  Patient will call back for any issues or complications.

## 2020-11-19 ENCOUNTER — IMMUNIZATION (OUTPATIENT)
Dept: PHARMACY | Facility: CLINIC | Age: 60
End: 2020-11-19
Payer: COMMERCIAL

## 2020-11-19 LAB — HIV 1+2 AB+HIV1 P24 AG SERPL QL IA: NEGATIVE

## 2020-12-18 ENCOUNTER — TELEPHONE (OUTPATIENT)
Dept: INTERNAL MEDICINE | Facility: CLINIC | Age: 60
End: 2020-12-18

## 2020-12-22 ENCOUNTER — TELEPHONE (OUTPATIENT)
Dept: INTERNAL MEDICINE | Facility: CLINIC | Age: 60
End: 2020-12-22

## 2020-12-31 ENCOUNTER — TELEPHONE (OUTPATIENT)
Dept: INTERNAL MEDICINE | Facility: CLINIC | Age: 60
End: 2020-12-31

## 2020-12-31 NOTE — TELEPHONE ENCOUNTER
----- Message from Jessica Soto sent at 12/30/2020  2:04 PM CST -----  Contact: MARITO MINA [[id number]]  Type: Patient Call Back Who called:MARITO MINA [[id number]] What is the request in detail:patient would like a like a call back in regards to letter needing for his insurance before the end of the year. Patient states this is very important and he's been reaching for a week. Can the clinic reply by [facility]?no Would the patient rather a call back or a response via [facility]? Call back Best call back number:[phone] (mobile)   Additional Information:

## 2021-03-17 ENCOUNTER — PATIENT MESSAGE (OUTPATIENT)
Dept: INTERNAL MEDICINE | Facility: CLINIC | Age: 61
End: 2021-03-17

## 2021-04-07 ENCOUNTER — PATIENT MESSAGE (OUTPATIENT)
Dept: INTERNAL MEDICINE | Facility: CLINIC | Age: 61
End: 2021-04-07

## 2021-05-25 ENCOUNTER — PATIENT OUTREACH (OUTPATIENT)
Dept: ADMINISTRATIVE | Facility: HOSPITAL | Age: 61
End: 2021-05-25

## 2021-05-28 ENCOUNTER — PATIENT OUTREACH (OUTPATIENT)
Dept: ADMINISTRATIVE | Facility: HOSPITAL | Age: 61
End: 2021-05-28

## 2021-06-02 ENCOUNTER — PATIENT OUTREACH (OUTPATIENT)
Dept: ADMINISTRATIVE | Facility: HOSPITAL | Age: 61
End: 2021-06-02

## 2021-09-23 ENCOUNTER — PATIENT MESSAGE (OUTPATIENT)
Dept: INTERNAL MEDICINE | Facility: CLINIC | Age: 61
End: 2021-09-23

## 2021-10-19 ENCOUNTER — PATIENT MESSAGE (OUTPATIENT)
Dept: INTERNAL MEDICINE | Facility: CLINIC | Age: 61
End: 2021-10-19

## 2021-10-19 DIAGNOSIS — I10 BENIGN ESSENTIAL HYPERTENSION: ICD-10-CM

## 2021-10-20 ENCOUNTER — PATIENT OUTREACH (OUTPATIENT)
Dept: ADMINISTRATIVE | Facility: HOSPITAL | Age: 61
End: 2021-10-20

## 2021-10-20 RX ORDER — VALSARTAN 80 MG/1
80 TABLET ORAL DAILY
Qty: 90 TABLET | Refills: 2 | Status: SHIPPED | OUTPATIENT
Start: 2021-10-20 | End: 2021-11-30

## 2021-11-16 ENCOUNTER — LAB VISIT (OUTPATIENT)
Dept: LAB | Facility: OTHER | Age: 61
End: 2021-11-16
Attending: INTERNAL MEDICINE
Payer: COMMERCIAL

## 2021-11-16 ENCOUNTER — OFFICE VISIT (OUTPATIENT)
Dept: INTERNAL MEDICINE | Facility: CLINIC | Age: 61
End: 2021-11-16
Attending: INTERNAL MEDICINE
Payer: COMMERCIAL

## 2021-11-16 VITALS
BODY MASS INDEX: 31.3 KG/M2 | HEIGHT: 71 IN | DIASTOLIC BLOOD PRESSURE: 90 MMHG | HEART RATE: 61 BPM | WEIGHT: 223.56 LBS | OXYGEN SATURATION: 98 % | SYSTOLIC BLOOD PRESSURE: 146 MMHG

## 2021-11-16 DIAGNOSIS — Z00.00 ANNUAL PHYSICAL EXAM: Primary | ICD-10-CM

## 2021-11-16 DIAGNOSIS — Z12.5 PROSTATE CANCER SCREENING: ICD-10-CM

## 2021-11-16 DIAGNOSIS — I10 BENIGN ESSENTIAL HYPERTENSION: ICD-10-CM

## 2021-11-16 DIAGNOSIS — G47.33 OSA (OBSTRUCTIVE SLEEP APNEA): ICD-10-CM

## 2021-11-16 DIAGNOSIS — Z87.891 FORMER SMOKER: ICD-10-CM

## 2021-11-16 DIAGNOSIS — Z00.00 ANNUAL PHYSICAL EXAM: ICD-10-CM

## 2021-11-16 LAB
ALBUMIN SERPL BCP-MCNC: 4.1 G/DL (ref 3.5–5.2)
ALP SERPL-CCNC: 68 U/L (ref 55–135)
ALT SERPL W/O P-5'-P-CCNC: 29 U/L (ref 10–44)
ANION GAP SERPL CALC-SCNC: 6 MMOL/L (ref 8–16)
AST SERPL-CCNC: 23 U/L (ref 10–40)
BASOPHILS # BLD AUTO: 0.05 K/UL (ref 0–0.2)
BASOPHILS NFR BLD: 1 % (ref 0–1.9)
BILIRUB SERPL-MCNC: 0.4 MG/DL (ref 0.1–1)
BUN SERPL-MCNC: 14 MG/DL (ref 8–23)
CALCIUM SERPL-MCNC: 9.3 MG/DL (ref 8.7–10.5)
CHLORIDE SERPL-SCNC: 107 MMOL/L (ref 95–110)
CHOLEST SERPL-MCNC: 198 MG/DL (ref 120–199)
CHOLEST/HDLC SERPL: 5.7 {RATIO} (ref 2–5)
CO2 SERPL-SCNC: 28 MMOL/L (ref 23–29)
COMPLEXED PSA SERPL-MCNC: 1.6 NG/ML (ref 0–4)
CREAT SERPL-MCNC: 0.9 MG/DL (ref 0.5–1.4)
DIFFERENTIAL METHOD: ABNORMAL
EOSINOPHIL # BLD AUTO: 0.1 K/UL (ref 0–0.5)
EOSINOPHIL NFR BLD: 2.1 % (ref 0–8)
ERYTHROCYTE [DISTWIDTH] IN BLOOD BY AUTOMATED COUNT: 12.1 % (ref 11.5–14.5)
EST. GFR  (AFRICAN AMERICAN): >60 ML/MIN/1.73 M^2
EST. GFR  (NON AFRICAN AMERICAN): >60 ML/MIN/1.73 M^2
ESTIMATED AVG GLUCOSE: 131 MG/DL (ref 68–131)
GLUCOSE SERPL-MCNC: 108 MG/DL (ref 70–110)
HBA1C MFR BLD: 6.2 % (ref 4–5.6)
HCT VFR BLD AUTO: 43.4 % (ref 40–54)
HDLC SERPL-MCNC: 35 MG/DL (ref 40–75)
HDLC SERPL: 17.7 % (ref 20–50)
HGB BLD-MCNC: 14.3 G/DL (ref 14–18)
IMM GRANULOCYTES # BLD AUTO: 0.01 K/UL (ref 0–0.04)
IMM GRANULOCYTES NFR BLD AUTO: 0.2 % (ref 0–0.5)
LDLC SERPL CALC-MCNC: 141.4 MG/DL (ref 63–159)
LYMPHOCYTES # BLD AUTO: 1.8 K/UL (ref 1–4.8)
LYMPHOCYTES NFR BLD: 33.5 % (ref 18–48)
MCH RBC QN AUTO: 31.4 PG (ref 27–31)
MCHC RBC AUTO-ENTMCNC: 32.9 G/DL (ref 32–36)
MCV RBC AUTO: 95 FL (ref 82–98)
MONOCYTES # BLD AUTO: 0.5 K/UL (ref 0.3–1)
MONOCYTES NFR BLD: 8.6 % (ref 4–15)
NEUTROPHILS # BLD AUTO: 2.9 K/UL (ref 1.8–7.7)
NEUTROPHILS NFR BLD: 54.6 % (ref 38–73)
NONHDLC SERPL-MCNC: 163 MG/DL
NRBC BLD-RTO: 0 /100 WBC
PLATELET # BLD AUTO: 239 K/UL (ref 150–450)
PMV BLD AUTO: 10.2 FL (ref 9.2–12.9)
POTASSIUM SERPL-SCNC: 5.1 MMOL/L (ref 3.5–5.1)
PROT SERPL-MCNC: 7 G/DL (ref 6–8.4)
RBC # BLD AUTO: 4.55 M/UL (ref 4.6–6.2)
SODIUM SERPL-SCNC: 141 MMOL/L (ref 136–145)
TRIGL SERPL-MCNC: 108 MG/DL (ref 30–150)
TSH SERPL DL<=0.005 MIU/L-ACNC: 2.58 UIU/ML (ref 0.4–4)
WBC # BLD AUTO: 5.22 K/UL (ref 3.9–12.7)

## 2021-11-16 PROCEDURE — 1159F PR MEDICATION LIST DOCUMENTED IN MEDICAL RECORD: ICD-10-PCS | Mod: CPTII,S$GLB,, | Performed by: INTERNAL MEDICINE

## 2021-11-16 PROCEDURE — 3008F BODY MASS INDEX DOCD: CPT | Mod: CPTII,S$GLB,, | Performed by: INTERNAL MEDICINE

## 2021-11-16 PROCEDURE — 4010F ACE/ARB THERAPY RXD/TAKEN: CPT | Mod: CPTII,S$GLB,, | Performed by: INTERNAL MEDICINE

## 2021-11-16 PROCEDURE — 4010F PR ACE/ARB THEARPY RXD/TAKEN: ICD-10-PCS | Mod: CPTII,S$GLB,, | Performed by: INTERNAL MEDICINE

## 2021-11-16 PROCEDURE — 36415 COLL VENOUS BLD VENIPUNCTURE: CPT | Performed by: INTERNAL MEDICINE

## 2021-11-16 PROCEDURE — 99214 OFFICE O/P EST MOD 30 MIN: CPT | Mod: S$GLB,,, | Performed by: INTERNAL MEDICINE

## 2021-11-16 PROCEDURE — 80061 LIPID PANEL: CPT | Performed by: INTERNAL MEDICINE

## 2021-11-16 PROCEDURE — 1159F MED LIST DOCD IN RCRD: CPT | Mod: CPTII,S$GLB,, | Performed by: INTERNAL MEDICINE

## 2021-11-16 PROCEDURE — 1160F RVW MEDS BY RX/DR IN RCRD: CPT | Mod: CPTII,S$GLB,, | Performed by: INTERNAL MEDICINE

## 2021-11-16 PROCEDURE — 99999 PR PBB SHADOW E&M-EST. PATIENT-LVL III: CPT | Mod: PBBFAC,,, | Performed by: INTERNAL MEDICINE

## 2021-11-16 PROCEDURE — 3008F PR BODY MASS INDEX (BMI) DOCUMENTED: ICD-10-PCS | Mod: CPTII,S$GLB,, | Performed by: INTERNAL MEDICINE

## 2021-11-16 PROCEDURE — 84443 ASSAY THYROID STIM HORMONE: CPT | Performed by: INTERNAL MEDICINE

## 2021-11-16 PROCEDURE — 3078F DIAST BP <80 MM HG: CPT | Mod: CPTII,S$GLB,, | Performed by: INTERNAL MEDICINE

## 2021-11-16 PROCEDURE — 1160F PR REVIEW ALL MEDS BY PRESCRIBER/CLIN PHARMACIST DOCUMENTED: ICD-10-PCS | Mod: CPTII,S$GLB,, | Performed by: INTERNAL MEDICINE

## 2021-11-16 PROCEDURE — 83036 HEMOGLOBIN GLYCOSYLATED A1C: CPT | Performed by: INTERNAL MEDICINE

## 2021-11-16 PROCEDURE — 80053 COMPREHEN METABOLIC PANEL: CPT | Performed by: INTERNAL MEDICINE

## 2021-11-16 PROCEDURE — 99214 PR OFFICE/OUTPT VISIT, EST, LEVL IV, 30-39 MIN: ICD-10-PCS | Mod: S$GLB,,, | Performed by: INTERNAL MEDICINE

## 2021-11-16 PROCEDURE — 84153 ASSAY OF PSA TOTAL: CPT | Performed by: INTERNAL MEDICINE

## 2021-11-16 PROCEDURE — 99999 PR PBB SHADOW E&M-EST. PATIENT-LVL III: ICD-10-PCS | Mod: PBBFAC,,, | Performed by: INTERNAL MEDICINE

## 2021-11-16 PROCEDURE — 85025 COMPLETE CBC W/AUTO DIFF WBC: CPT | Performed by: INTERNAL MEDICINE

## 2021-11-16 PROCEDURE — 3077F PR MOST RECENT SYSTOLIC BLOOD PRESSURE >= 140 MM HG: ICD-10-PCS | Mod: CPTII,S$GLB,, | Performed by: INTERNAL MEDICINE

## 2021-11-16 PROCEDURE — 3078F PR MOST RECENT DIASTOLIC BLOOD PRESSURE < 80 MM HG: ICD-10-PCS | Mod: CPTII,S$GLB,, | Performed by: INTERNAL MEDICINE

## 2021-11-16 PROCEDURE — 3077F SYST BP >= 140 MM HG: CPT | Mod: CPTII,S$GLB,, | Performed by: INTERNAL MEDICINE

## 2021-11-16 RX ORDER — KETOCONAZOLE 20 MG/G
CREAM TOPICAL 2 TIMES DAILY
Qty: 15 G | Refills: 0 | Status: SHIPPED | OUTPATIENT
Start: 2021-11-16 | End: 2022-05-16 | Stop reason: SDUPTHER

## 2021-11-16 RX ORDER — CYCLOBENZAPRINE HCL 10 MG
10 TABLET ORAL 3 TIMES DAILY PRN
COMMUNITY

## 2021-11-22 ENCOUNTER — PATIENT MESSAGE (OUTPATIENT)
Dept: INTERNAL MEDICINE | Facility: CLINIC | Age: 61
End: 2021-11-22
Payer: COMMERCIAL

## 2021-11-24 RX ORDER — ATORVASTATIN CALCIUM 40 MG/1
40 TABLET, FILM COATED ORAL DAILY
Qty: 90 TABLET | Refills: 3 | Status: SHIPPED | OUTPATIENT
Start: 2021-11-24 | End: 2022-05-16 | Stop reason: SDUPTHER

## 2021-11-30 ENCOUNTER — CLINICAL SUPPORT (OUTPATIENT)
Dept: INTERNAL MEDICINE | Facility: CLINIC | Age: 61
End: 2021-11-30
Payer: COMMERCIAL

## 2021-11-30 VITALS
OXYGEN SATURATION: 97 % | TEMPERATURE: 63 F | SYSTOLIC BLOOD PRESSURE: 142 MMHG | DIASTOLIC BLOOD PRESSURE: 78 MMHG | HEART RATE: 66 BPM

## 2021-11-30 DIAGNOSIS — I10 BENIGN ESSENTIAL HYPERTENSION: ICD-10-CM

## 2021-11-30 PROCEDURE — 99999 PR PBB SHADOW E&M-EST. PATIENT-LVL II: CPT | Mod: PBBFAC,,,

## 2021-11-30 PROCEDURE — 99999 PR PBB SHADOW E&M-EST. PATIENT-LVL II: ICD-10-PCS | Mod: PBBFAC,,,

## 2021-11-30 RX ORDER — VALSARTAN 160 MG/1
160 TABLET ORAL DAILY
Qty: 90 TABLET | Refills: 0 | Status: SHIPPED | OUTPATIENT
Start: 2021-11-30 | End: 2022-03-02 | Stop reason: SDUPTHER

## 2021-12-03 ENCOUNTER — TELEPHONE (OUTPATIENT)
Dept: INTERNAL MEDICINE | Facility: CLINIC | Age: 61
End: 2021-12-03
Payer: COMMERCIAL

## 2021-12-08 ENCOUNTER — PATIENT MESSAGE (OUTPATIENT)
Dept: INTERNAL MEDICINE | Facility: CLINIC | Age: 61
End: 2021-12-08
Payer: COMMERCIAL

## 2022-01-02 ENCOUNTER — PATIENT MESSAGE (OUTPATIENT)
Dept: INTERNAL MEDICINE | Facility: CLINIC | Age: 62
End: 2022-01-02
Payer: COMMERCIAL

## 2022-01-11 ENCOUNTER — PATIENT MESSAGE (OUTPATIENT)
Dept: INTERNAL MEDICINE | Facility: CLINIC | Age: 62
End: 2022-01-11
Payer: COMMERCIAL

## 2022-02-16 ENCOUNTER — PATIENT MESSAGE (OUTPATIENT)
Dept: INTERNAL MEDICINE | Facility: CLINIC | Age: 62
End: 2022-02-16
Payer: COMMERCIAL

## 2022-02-28 ENCOUNTER — TELEPHONE (OUTPATIENT)
Dept: INTERNAL MEDICINE | Facility: CLINIC | Age: 62
End: 2022-02-28
Payer: COMMERCIAL

## 2022-02-28 ENCOUNTER — PATIENT MESSAGE (OUTPATIENT)
Dept: INTERNAL MEDICINE | Facility: CLINIC | Age: 62
End: 2022-02-28
Payer: COMMERCIAL

## 2022-02-28 DIAGNOSIS — I10 BENIGN ESSENTIAL HYPERTENSION: ICD-10-CM

## 2022-03-02 NOTE — TELEPHONE ENCOUNTER
No new care gaps identified.  Powered by [employer]. Reference number: [id number].   3/02/2022 8:36:38 AM CST

## 2022-03-02 NOTE — TELEPHONE ENCOUNTER
Please confirm the dose of Valsartan. 40 mg or 160 mg? Half tablet or a whole tablet?    his last blood pressure number in our records was high.  Please ask the patient what his home blood pressure numbers have been.  Please add it in the remote BP vitals and let Dr. Rice know. Thanks!

## 2022-03-04 RX ORDER — VALSARTAN 40 MG/1
40 TABLET ORAL DAILY
Qty: 90 TABLET | Refills: 3 | Status: SHIPPED | OUTPATIENT
Start: 2022-03-04 | End: 2022-12-05

## 2022-05-16 ENCOUNTER — OFFICE VISIT (OUTPATIENT)
Dept: INTERNAL MEDICINE | Facility: CLINIC | Age: 62
End: 2022-05-16
Attending: INTERNAL MEDICINE
Payer: COMMERCIAL

## 2022-05-16 VITALS
OXYGEN SATURATION: 98 % | BODY MASS INDEX: 28.86 KG/M2 | HEIGHT: 71 IN | HEART RATE: 58 BPM | SYSTOLIC BLOOD PRESSURE: 134 MMHG | DIASTOLIC BLOOD PRESSURE: 76 MMHG | WEIGHT: 206.13 LBS

## 2022-05-16 DIAGNOSIS — E78.2 MIXED HYPERLIPIDEMIA: Primary | ICD-10-CM

## 2022-05-16 DIAGNOSIS — M54.2 CHRONIC NECK PAIN: ICD-10-CM

## 2022-05-16 DIAGNOSIS — I10 BENIGN ESSENTIAL HYPERTENSION: Primary | ICD-10-CM

## 2022-05-16 DIAGNOSIS — M54.50 RECURRENT LOW BACK PAIN: ICD-10-CM

## 2022-05-16 DIAGNOSIS — E66.3 OVERWEIGHT (BMI 25.0-29.9): ICD-10-CM

## 2022-05-16 DIAGNOSIS — R21 RASH: ICD-10-CM

## 2022-05-16 DIAGNOSIS — G89.29 CHRONIC NECK PAIN: ICD-10-CM

## 2022-05-16 DIAGNOSIS — E78.2 MIXED HYPERLIPIDEMIA: ICD-10-CM

## 2022-05-16 PROCEDURE — 3008F BODY MASS INDEX DOCD: CPT | Mod: CPTII,S$GLB,, | Performed by: INTERNAL MEDICINE

## 2022-05-16 PROCEDURE — 3078F DIAST BP <80 MM HG: CPT | Mod: CPTII,S$GLB,, | Performed by: INTERNAL MEDICINE

## 2022-05-16 PROCEDURE — 99214 PR OFFICE/OUTPT VISIT, EST, LEVL IV, 30-39 MIN: ICD-10-PCS | Mod: S$GLB,,, | Performed by: INTERNAL MEDICINE

## 2022-05-16 PROCEDURE — 99214 OFFICE O/P EST MOD 30 MIN: CPT | Mod: S$GLB,,, | Performed by: INTERNAL MEDICINE

## 2022-05-16 PROCEDURE — 3075F SYST BP GE 130 - 139MM HG: CPT | Mod: CPTII,S$GLB,, | Performed by: INTERNAL MEDICINE

## 2022-05-16 PROCEDURE — 4010F ACE/ARB THERAPY RXD/TAKEN: CPT | Mod: CPTII,S$GLB,, | Performed by: INTERNAL MEDICINE

## 2022-05-16 PROCEDURE — 4010F PR ACE/ARB THEARPY RXD/TAKEN: ICD-10-PCS | Mod: CPTII,S$GLB,, | Performed by: INTERNAL MEDICINE

## 2022-05-16 PROCEDURE — 3075F PR MOST RECENT SYSTOLIC BLOOD PRESS GE 130-139MM HG: ICD-10-PCS | Mod: CPTII,S$GLB,, | Performed by: INTERNAL MEDICINE

## 2022-05-16 PROCEDURE — 1160F RVW MEDS BY RX/DR IN RCRD: CPT | Mod: CPTII,S$GLB,, | Performed by: INTERNAL MEDICINE

## 2022-05-16 PROCEDURE — 1159F MED LIST DOCD IN RCRD: CPT | Mod: CPTII,S$GLB,, | Performed by: INTERNAL MEDICINE

## 2022-05-16 PROCEDURE — 1160F PR REVIEW ALL MEDS BY PRESCRIBER/CLIN PHARMACIST DOCUMENTED: ICD-10-PCS | Mod: CPTII,S$GLB,, | Performed by: INTERNAL MEDICINE

## 2022-05-16 PROCEDURE — 99999 PR PBB SHADOW E&M-EST. PATIENT-LVL III: CPT | Mod: PBBFAC,,, | Performed by: INTERNAL MEDICINE

## 2022-05-16 PROCEDURE — 1159F PR MEDICATION LIST DOCUMENTED IN MEDICAL RECORD: ICD-10-PCS | Mod: CPTII,S$GLB,, | Performed by: INTERNAL MEDICINE

## 2022-05-16 PROCEDURE — 99999 PR PBB SHADOW E&M-EST. PATIENT-LVL III: ICD-10-PCS | Mod: PBBFAC,,, | Performed by: INTERNAL MEDICINE

## 2022-05-16 PROCEDURE — 3078F PR MOST RECENT DIASTOLIC BLOOD PRESSURE < 80 MM HG: ICD-10-PCS | Mod: CPTII,S$GLB,, | Performed by: INTERNAL MEDICINE

## 2022-05-16 PROCEDURE — 3008F PR BODY MASS INDEX (BMI) DOCUMENTED: ICD-10-PCS | Mod: CPTII,S$GLB,, | Performed by: INTERNAL MEDICINE

## 2022-05-16 RX ORDER — ATORVASTATIN CALCIUM 40 MG/1
40 TABLET, FILM COATED ORAL DAILY
Qty: 90 TABLET | Refills: 3 | Status: SHIPPED | OUTPATIENT
Start: 2022-05-16 | End: 2023-02-27 | Stop reason: SDUPTHER

## 2022-05-16 RX ORDER — KETOCONAZOLE 20 MG/G
CREAM TOPICAL 2 TIMES DAILY
Qty: 15 G | Refills: 0 | Status: SHIPPED | OUTPATIENT
Start: 2022-05-16

## 2022-05-16 NOTE — LETTER
May 16, 2022    Fabian Ivy  [address]  [place] [address]         [facility]  [address]  [place] [address]  Phone: [phone]  Fax: [phone] May 16, 2022     Patient: Fabian Ivy   YOB: 1960   Date of Visit: 5/16/2022       To Whom It May Concern:    It is my medical opinion that Fabian Ivy requires a low impact form of cardiovascular exercise such as a recumbant bike..    If you have any questions or concerns, please don't hesitate to call.    Sincerely,          Dieter Rice MD

## 2022-05-16 NOTE — TELEPHONE ENCOUNTER
No new care gaps identified.  [facility] Embedded Care Gaps. Reference number: [id number]. 5/16/2022   9:22:28 AM RISHABHT

## 2022-05-16 NOTE — PROGRESS NOTES
"Subjective:       Patient ID: Fabian Ivy is a 62 y.o. male.    Chief Complaint: No chief complaint on file.    Here for f/u for HTN and recurrent low back pain    Lost nearly 25 lb with regular consistent exercise.  In the past we discovered recumbent bike does not irritate his recurrent lumbar go and is quite effective.     ### HTN ###  Valsartan 40 mg   Typically sees 120-low 130s/70-low80s. Denies frequent or current HA, intermittent blurry vision, dizziness, CP, or SOB.     ### Former Smoker ###  CT lung screening offered.  Patient would like to defer discussion to next year. Pt denies SOB at rest or ROD, PND, chest tightness, wheezing, chronic cough, hemoptysis, night sweats, or unexpected weight loss.    ### lumbar and cervical DJD ###  Intermittent low back pain, bilateral radiation down buttocks and stops there.   low back bilateral hip pain that radiates own the sides of his thighs.     ### HLD ###  liptior 40mg          Review of Systems   Constitutional: Negative for appetite change, chills, fever and unexpected weight change.   HENT: Negative for hearing loss, sore throat and trouble swallowing.    Eyes: Negative for visual disturbance.   Respiratory: Negative for cough, chest tightness and shortness of breath.    Cardiovascular: Negative for chest pain and leg swelling.   Gastrointestinal: Negative for abdominal pain, blood in stool, constipation, diarrhea, nausea and vomiting.   Endocrine: Negative for polydipsia and polyuria.   Genitourinary: Negative for decreased urine volume, difficulty urinating, dysuria, frequency and urgency.   Musculoskeletal: Positive for arthralgias and back pain. Negative for gait problem.   Skin: Negative for rash.   Neurological: Negative for dizziness and numbness.   Psychiatric/Behavioral: The patient is not nervous/anxious.        Objective:      Vitals:    05/16/22 0828   BP: 134/76   Pulse: (!) 58   SpO2: 98%   Weight: 93.5 kg (206 lb 2.1 oz)   Height: 5' 11" (1.803 " m)      Physical Exam  Constitutional:       General: He is not in acute distress.     Appearance: He is well-developed.   HENT:      Head: Normocephalic and atraumatic.      Mouth/Throat:      Pharynx: No oropharyngeal exudate.   Eyes:      General: No scleral icterus.     Conjunctiva/sclera: Conjunctivae normal.      Pupils: Pupils are equal, round, and reactive to light.   Neck:      Thyroid: No thyromegaly.   Cardiovascular:      Rate and Rhythm: Normal rate and regular rhythm.      Heart sounds: Normal heart sounds. No murmur heard.  Pulmonary:      Effort: Pulmonary effort is normal.      Breath sounds: Normal breath sounds. No wheezing or rales.   Abdominal:      General: There is no distension.      Palpations: Abdomen is soft.      Tenderness: There is no abdominal tenderness.   Musculoskeletal:         General: No tenderness.   Lymphadenopathy:      Cervical: No cervical adenopathy.   Skin:     General: Skin is warm and dry.   Neurological:      Mental Status: He is alert and oriented to person, place, and time.   Psychiatric:         Behavior: Behavior normal.         Assessment:       1. Benign essential hypertension    2. Overweight (BMI 25.0-29.9)    3. Chronic neck pain    4. Recurrent low back pain    5. Mixed hyperlipidemia        Plan:       Diagnoses and all orders for this visit:    Benign essential hypertension   Controlled and asymptomatic.  Continue current Rx regimen.    Overweight (BMI 25.0-29.9)   Improving Continue current Rx regimen.     Wt Readings from Last 3 Encounters:   05/16/22 0828 93.5 kg (206 lb 2.1 oz)   11/16/21 0915 101.4 kg (223 lb 8.7 oz)   11/18/20 0936 99.8 kg (220 lb 0.3 oz)         Chronic neck pain    Recurrent low back pain    Mixed hyperlipidemia   Tolerating statin. Continue this.     RTC in 6 months or sooner prn            Dieter Singh MD  Internal Medicine-[facility]        Side effects of medication(s) were discussed in detail and patient voiced  understanding.  Patient will call back for any issues or complications.

## 2023-01-19 ENCOUNTER — TELEPHONE (OUTPATIENT)
Dept: ORTHOPEDICS | Facility: CLINIC | Age: 63
End: 2023-01-19
Payer: COMMERCIAL

## 2023-01-19 ENCOUNTER — HOSPITAL ENCOUNTER (OUTPATIENT)
Dept: RADIOLOGY | Facility: OTHER | Age: 63
Discharge: HOME | End: 2023-01-19
Attending: INTERNAL MEDICINE
Payer: COMMERCIAL

## 2023-01-19 ENCOUNTER — OFFICE VISIT (OUTPATIENT)
Dept: INTERNAL MEDICINE | Facility: CLINIC | Age: 63
End: 2023-01-19
Attending: INTERNAL MEDICINE
Payer: COMMERCIAL

## 2023-01-19 VITALS
DIASTOLIC BLOOD PRESSURE: 68 MMHG | HEIGHT: 71 IN | BODY MASS INDEX: 30.09 KG/M2 | SYSTOLIC BLOOD PRESSURE: 132 MMHG | WEIGHT: 214.94 LBS | OXYGEN SATURATION: 98 % | HEART RATE: 63 BPM

## 2023-01-19 DIAGNOSIS — G47.33 OSA (OBSTRUCTIVE SLEEP APNEA): ICD-10-CM

## 2023-01-19 DIAGNOSIS — Z00.00 ANNUAL PHYSICAL EXAM: Primary | ICD-10-CM

## 2023-01-19 DIAGNOSIS — M79.641 BILATERAL HAND PAIN: ICD-10-CM

## 2023-01-19 DIAGNOSIS — M79.642 BILATERAL HAND PAIN: ICD-10-CM

## 2023-01-19 DIAGNOSIS — I10 BENIGN ESSENTIAL HYPERTENSION: ICD-10-CM

## 2023-01-19 DIAGNOSIS — E78.2 MIXED HYPERLIPIDEMIA: ICD-10-CM

## 2023-01-19 DIAGNOSIS — Z87.891 FORMER SMOKER: ICD-10-CM

## 2023-01-19 DIAGNOSIS — R73.03 PRE-DIABETES: ICD-10-CM

## 2023-01-19 DIAGNOSIS — Z12.5 PROSTATE CANCER SCREENING: ICD-10-CM

## 2023-01-19 DIAGNOSIS — E66.3 OVERWEIGHT (BMI 25.0-29.9): ICD-10-CM

## 2023-01-19 PROCEDURE — 1160F RVW MEDS BY RX/DR IN RCRD: CPT | Mod: CPTII,S$GLB,, | Performed by: INTERNAL MEDICINE

## 2023-01-19 PROCEDURE — 3008F PR BODY MASS INDEX (BMI) DOCUMENTED: ICD-10-PCS | Mod: CPTII,S$GLB,, | Performed by: INTERNAL MEDICINE

## 2023-01-19 PROCEDURE — 73130 XR HAND COMPLETE 3 VIEWS BILATERAL: ICD-10-PCS | Mod: 26,,, | Performed by: RADIOLOGY

## 2023-01-19 PROCEDURE — 3075F PR MOST RECENT SYSTOLIC BLOOD PRESS GE 130-139MM HG: ICD-10-PCS | Mod: CPTII,S$GLB,, | Performed by: INTERNAL MEDICINE

## 2023-01-19 PROCEDURE — 1159F PR MEDICATION LIST DOCUMENTED IN MEDICAL RECORD: ICD-10-PCS | Mod: CPTII,S$GLB,, | Performed by: INTERNAL MEDICINE

## 2023-01-19 PROCEDURE — 1160F PR REVIEW ALL MEDS BY PRESCRIBER/CLIN PHARMACIST DOCUMENTED: ICD-10-PCS | Mod: CPTII,S$GLB,, | Performed by: INTERNAL MEDICINE

## 2023-01-19 PROCEDURE — 3078F DIAST BP <80 MM HG: CPT | Mod: CPTII,S$GLB,, | Performed by: INTERNAL MEDICINE

## 2023-01-19 PROCEDURE — 73130 X-RAY EXAM OF HAND: CPT | Mod: 26,,, | Performed by: RADIOLOGY

## 2023-01-19 PROCEDURE — 99396 PR PREVENTIVE VISIT,EST,40-64: ICD-10-PCS | Mod: S$GLB,,, | Performed by: INTERNAL MEDICINE

## 2023-01-19 PROCEDURE — 99999 PR PBB SHADOW E&M-EST. PATIENT-LVL IV: ICD-10-PCS | Mod: PBBFAC,,, | Performed by: INTERNAL MEDICINE

## 2023-01-19 PROCEDURE — 3075F SYST BP GE 130 - 139MM HG: CPT | Mod: CPTII,S$GLB,, | Performed by: INTERNAL MEDICINE

## 2023-01-19 PROCEDURE — 1159F MED LIST DOCD IN RCRD: CPT | Mod: CPTII,S$GLB,, | Performed by: INTERNAL MEDICINE

## 2023-01-19 PROCEDURE — 73130 X-RAY EXAM OF HAND: CPT | Mod: TC,50,FY

## 2023-01-19 PROCEDURE — 99396 PREV VISIT EST AGE 40-64: CPT | Mod: S$GLB,,, | Performed by: INTERNAL MEDICINE

## 2023-01-19 PROCEDURE — 3078F PR MOST RECENT DIASTOLIC BLOOD PRESSURE < 80 MM HG: ICD-10-PCS | Mod: CPTII,S$GLB,, | Performed by: INTERNAL MEDICINE

## 2023-01-19 PROCEDURE — 99999 PR PBB SHADOW E&M-EST. PATIENT-LVL IV: CPT | Mod: PBBFAC,,, | Performed by: INTERNAL MEDICINE

## 2023-01-19 PROCEDURE — 3008F BODY MASS INDEX DOCD: CPT | Mod: CPTII,S$GLB,, | Performed by: INTERNAL MEDICINE

## 2023-01-19 RX ORDER — INFLUENZA A VIRUS A/DELAWARE/55/2019 CVR-45 (H1N1) ANTIGEN (MDCK CELL DERIVED, PROPIOLACTONE INACTIVATED), INFLUENZA A VIRUS A/DARWIN/11/2021 (H3N2) ANTIGEN (MDCK CELL DERIVED, PROPIOLACTONE INACTIVATED), INFLUENZA B VIRUS B/SINGAPORE/WUH4618/2021 ANTIGEN (MDCK CELL DERIVED, PROPIOLACTONE INACTIVATED), INFLUENZA B VIRUS B/SINGAPORE/INFTT-16-0610/2016 ANTIGEN (MDCK CELL DERIVED, PROPIOLACTONE INACTIVATED) 15; 15; 15; 15 UG/.5ML; UG/.5ML; UG/.5ML; UG/.5ML
INJECTION, SUSPENSION INTRAMUSCULAR
COMMUNITY
Start: 2022-09-25 | End: 2023-01-19

## 2023-01-19 RX ORDER — METHYLPREDNISOLONE 4 MG/1
TABLET ORAL
COMMUNITY
Start: 2022-08-22 | End: 2023-01-19

## 2023-01-19 RX ORDER — BNT162B2 0.23 MG/2.25ML
INJECTION, SUSPENSION INTRAMUSCULAR
COMMUNITY
Start: 2022-08-04

## 2023-01-19 NOTE — PROGRESS NOTES
Subjective:       Patient ID: Fabian Ivy is a 62 y.o. male.    Chief Complaint: Annual Exam    Here for routine f/u    Bilateral hand and palm pain for past 15 + years. Dasilva. Can not close right fist entirely.     Lost nearly 25 lb with regular consistent exercise.  In the past we discovered recumbent bike does not irritate his recurrent lumbar go and is quite effective.      ### HTN ###  Valsartan 40 mg   Typically sees 120-low 130s/70-low80s. Denies frequent or current HA, intermittent blurry vision, dizziness, CP, or SOB.     ### Former Smoker ###  35pk years Quit 2017  -1/19/23 CT lung screening offered.  Patient would again like to defer discussion to next year. Pt denies SOB at rest or ROD, PND, chest tightness, wheezing, chronic cough, hemoptysis, night sweats, or unexpected weight loss.     ### lumbar and cervical DJD ###  Intermittent low back pain, bilateral radiation down buttocks and stops there.   low back bilateral hip pain that radiates own the sides of his thighs.   He does not have sacroiliitis       ### HLD ###  liptior 40mg    ### TORY ###  -1/19/23Using. Due for sleep f/u. Offered. Pt declined visit.        Review of Systems   Constitutional:  Negative for appetite change, chills, fever and unexpected weight change.   HENT:  Negative for hearing loss, sore throat and trouble swallowing.    Eyes:  Negative for visual disturbance.   Respiratory:  Negative for cough, chest tightness and shortness of breath.    Cardiovascular:  Negative for chest pain and leg swelling.   Gastrointestinal:  Negative for abdominal pain, blood in stool, constipation, diarrhea, nausea and vomiting.   Endocrine: Negative for polydipsia and polyuria.   Genitourinary:  Negative for decreased urine volume, difficulty urinating, dysuria, frequency and urgency.   Musculoskeletal:  Negative for gait problem.   Skin:  Negative for rash.   Neurological:  Negative for dizziness and numbness.   Psychiatric/Behavioral:  The  "patient is not nervous/anxious.      Objective:      Vitals:    01/19/23 0900   BP: 132/68   Pulse: 63   SpO2: 98%   Weight: 97.5 kg (214 lb 15.2 oz)   Height: 5' 11" (1.803 m)      Physical Exam  Vitals and nursing note reviewed.   Constitutional:       General: He is not in acute distress.     Appearance: Normal appearance. He is well-developed.   HENT:      Head: Normocephalic and atraumatic.      Mouth/Throat:      Pharynx: No oropharyngeal exudate.   Eyes:      General: No scleral icterus.     Conjunctiva/sclera: Conjunctivae normal.      Pupils: Pupils are equal, round, and reactive to light.   Neck:      Thyroid: No thyromegaly.   Cardiovascular:      Rate and Rhythm: Normal rate and regular rhythm.      Heart sounds: Normal heart sounds. No murmur heard.  Pulmonary:      Effort: Pulmonary effort is normal.      Breath sounds: Normal breath sounds. No wheezing or rales.   Abdominal:      General: There is no distension.   Musculoskeletal:         General: No tenderness.   Lymphadenopathy:      Cervical: No cervical adenopathy.   Skin:     General: Skin is warm and dry.   Neurological:      Mental Status: He is alert and oriented to person, place, and time.   Psychiatric:         Behavior: Behavior normal.       Assessment:       1. Annual physical exam    2. Prostate cancer screening    3. Benign essential hypertension    4. TORY (obstructive sleep apnea)    5. Mixed hyperlipidemia    6. Former smoker    7. Overweight (BMI 25.0-29.9)    8. Bilateral hand pain    9. Pre-diabetes          Plan:       Fabian was seen today for annual exam.    Diagnoses and all orders for this visit:    Annual physical exam  -     CBC Auto Differential; Future  -     Comprehensive Metabolic Panel; Future  -     Hemoglobin A1C; Future  -     Lipid Panel; Future  -     TSH; Future    Prostate cancer screening  -     PSA, Screening; Future    Benign essential hypertension   Controlled and asymptomatic.  Continue current Rx " regimen.    TORY (obstructive sleep apnea)  -     Ambulatory referral/consult to Sleep Disorders; Future    Mixed hyperlipidemia   Tolerating statin. Continue this.     Former smoker   Pt declined CT lung screening despite discussion of r/b    Overweight (BMI 25.0-29.9)   Patient should eat food, not too much, and most should be vegetables and lean protein. Discussed goal of weight loss to be accomplished by calorie restriction to approx 6291-5279 calories/day. Cumulative psychical activity throughout your day does increase weight loss.  Vary sources in diet (ie different colored vegetables) along with adequate fiber discussed. Consistent and sustainable practices are key. Will review diet periodically to scan for potential for vitamin deficiencies.   Discussed a minimum exercise goal of moderate paced walking or similar level of activity for 30-45 consecutive minutes 4-5 times a week for cardiovascular benefit and overall reduction in morbidity and mortality.    Bilateral hand pain  -     Ambulatory referral/consult to Hand Surgery; Future  -     X-Ray Hand 3 View Bilateral; Future    Pre-diabetes   Update A1c.    RTC in 6 months or sooner rianna Singh MD  Internal Medicine-[facility]        Side effects of medication(s) were discussed in detail and patient voiced understanding.  Patient will call back for any issues or complications.

## 2023-01-19 NOTE — TELEPHONE ENCOUNTER
Spoke c pt. Confirmed appt location & time c Dr. Cruz 01/24/23. Pt expressed understanding & was thankful.

## 2023-01-20 ENCOUNTER — PATIENT MESSAGE (OUTPATIENT)
Dept: INTERNAL MEDICINE | Facility: CLINIC | Age: 63
End: 2023-01-20
Payer: COMMERCIAL

## 2023-01-24 ENCOUNTER — OFFICE VISIT (OUTPATIENT)
Dept: ORTHOPEDICS | Facility: CLINIC | Age: 63
End: 2023-01-24
Payer: COMMERCIAL

## 2023-01-24 ENCOUNTER — TELEPHONE (OUTPATIENT)
Dept: INTERNAL MEDICINE | Facility: CLINIC | Age: 63
End: 2023-01-24
Payer: COMMERCIAL

## 2023-01-24 VITALS
BODY MASS INDEX: 29.96 KG/M2 | HEIGHT: 71 IN | SYSTOLIC BLOOD PRESSURE: 132 MMHG | DIASTOLIC BLOOD PRESSURE: 68 MMHG | WEIGHT: 214 LBS

## 2023-01-24 DIAGNOSIS — M79.641 BILATERAL HAND PAIN: ICD-10-CM

## 2023-01-24 DIAGNOSIS — M79.642 BILATERAL HAND PAIN: ICD-10-CM

## 2023-01-24 PROCEDURE — 3044F PR MOST RECENT HEMOGLOBIN A1C LEVEL <7.0%: ICD-10-PCS | Mod: CPTII,S$GLB,, | Performed by: ORTHOPAEDIC SURGERY

## 2023-01-24 PROCEDURE — 99999 PR PBB SHADOW E&M-EST. PATIENT-LVL III: ICD-10-PCS | Mod: PBBFAC,,, | Performed by: ORTHOPAEDIC SURGERY

## 2023-01-24 PROCEDURE — 3008F PR BODY MASS INDEX (BMI) DOCUMENTED: ICD-10-PCS | Mod: CPTII,S$GLB,, | Performed by: ORTHOPAEDIC SURGERY

## 2023-01-24 PROCEDURE — 99999 PR PBB SHADOW E&M-EST. PATIENT-LVL III: CPT | Mod: PBBFAC,,, | Performed by: ORTHOPAEDIC SURGERY

## 2023-01-24 PROCEDURE — 3078F PR MOST RECENT DIASTOLIC BLOOD PRESSURE < 80 MM HG: ICD-10-PCS | Mod: CPTII,S$GLB,, | Performed by: ORTHOPAEDIC SURGERY

## 2023-01-24 PROCEDURE — 3078F DIAST BP <80 MM HG: CPT | Mod: CPTII,S$GLB,, | Performed by: ORTHOPAEDIC SURGERY

## 2023-01-24 PROCEDURE — 3075F PR MOST RECENT SYSTOLIC BLOOD PRESS GE 130-139MM HG: ICD-10-PCS | Mod: CPTII,S$GLB,, | Performed by: ORTHOPAEDIC SURGERY

## 2023-01-24 PROCEDURE — 3044F HG A1C LEVEL LT 7.0%: CPT | Mod: CPTII,S$GLB,, | Performed by: ORTHOPAEDIC SURGERY

## 2023-01-24 PROCEDURE — 3008F BODY MASS INDEX DOCD: CPT | Mod: CPTII,S$GLB,, | Performed by: ORTHOPAEDIC SURGERY

## 2023-01-24 PROCEDURE — 99204 PR OFFICE/OUTPT VISIT, NEW, LEVL IV, 45-59 MIN: ICD-10-PCS | Mod: S$GLB,,, | Performed by: ORTHOPAEDIC SURGERY

## 2023-01-24 PROCEDURE — 3075F SYST BP GE 130 - 139MM HG: CPT | Mod: CPTII,S$GLB,, | Performed by: ORTHOPAEDIC SURGERY

## 2023-01-24 PROCEDURE — 99204 OFFICE O/P NEW MOD 45 MIN: CPT | Mod: S$GLB,,, | Performed by: ORTHOPAEDIC SURGERY

## 2023-01-24 PROCEDURE — 1159F MED LIST DOCD IN RCRD: CPT | Mod: CPTII,S$GLB,, | Performed by: ORTHOPAEDIC SURGERY

## 2023-01-24 PROCEDURE — 1159F PR MEDICATION LIST DOCUMENTED IN MEDICAL RECORD: ICD-10-PCS | Mod: CPTII,S$GLB,, | Performed by: ORTHOPAEDIC SURGERY

## 2023-01-24 NOTE — PROGRESS NOTES
Subjective:       Patient ID: Fabian Ivy is a 62 y.o. male.    Chief Complaint: Pain of the Right Hand and Pain of the Left Hand    Patient presents to the clinic for his right hand pain. His pain started about 10+ years. He renovates homes as an occupation.  Pt has had pain in right hand since . HAs not been able tot make a fist for years in comparison to the other side,Pt has a herniated a disc in spine  No injury    Pt renovates homes    Pt does not have popping or locking    No N/T, just weakness, opening jars    No treatment at this time        Past Medical History:   Diagnosis Date    Hepatitis C     s/p harvone    Hypertension      No past surgical history on file.  Family History   Problem Relation Age of Onset    Cancer Father         lung     Social History     Socioeconomic History    Marital status:    Tobacco Use    Smoking status: Former     Packs/day: 0.50     Years: 35.00     Pack years: 17.50     Types: Cigarettes     Quit date: 2017     Years since quittin.0    Smokeless tobacco: Former    Tobacco comments:     nicorette gum   Substance and Sexual Activity    Alcohol use: No    Drug use: No    Sexual activity: Yes     Partners: Female   Social History Narrative     x 2.  Adopted daughter.  Occup:  Self employed investing, real estate.         Current Outpatient Medications   Medication Sig Dispense Refill    atorvastatin (LIPITOR) 40 MG tablet Take 1 tablet (40 mg total) by mouth once daily. 90 tablet 3    cyclobenzaprine (FLEXERIL) 10 MG tablet Take 10 mg by mouth 3 (three) times daily as needed.      ketoconazole (NIZORAL) 2 % cream Apply topically 2 (two) times daily. Use for minimum of 2 weeks 15 g 0    PFIZER COVID-19 BUCK VACCN,PF, 30 mcg/0.3 mL injection       valsartan (DIOVAN) 40 MG tablet TAKE 1 TABLET(40 MG) BY MOUTH EVERY DAY 90 tablet 3    etodolac (LODINE XL) 400 MG 24 hr tablet   2    etodolac (LODINE) 400 MG tablet TK 1 T PO D WF  1     No current  "facility-administered medications for this visit.     Review of patient's allergies indicates:  No Known Allergies    Review of Systems    Objective:      Vitals:    01/24/23 1120   BP: 132/68   Weight: 97.1 kg (214 lb)   Height: 5' 11" (1.803 m)     Physical Exam    A, O x 3 , WD, EN, NAD  Cannot make composite fist on right- long finger- passively this does make a fist, nontender to palpation over the A1 pulley no locking or popping of the trigger of a possible trigger finger no rupture of sagittal band only tender to palpation over the MCP joint  Skin is clean dry and intact he is neurovascularly intact  Lab Review: none  Diagnostics Review: X-Ray: Reviewed MCP joint arthritis     Assessment:       1. Bilateral hand pain          Plan:       Based on examination patient has osteoarthritis of the MCP joint he is nontender over the A1 pulley he has no locking or popping he just has stiffness and pain over the joint is confirmed with x-rays after much discussion with the patient we talked about anti-inflammatory diet paraffin anti-inflammatory cream we can also do injections in the future if it is painful also discussed that if it becomes more painful we can do a silastic implant but at this point it is not that painful to him he just wanted to check up make sure everything is okay  "

## 2023-01-24 NOTE — TELEPHONE ENCOUNTER
----- Message from Aide Umanzor sent at 1/24/2023  9:37 AM CST -----  [facility] is [facility]ing[facility]  [phone]          What is the request in detail: Jose faxed over a rx for pt and he is following up to see if you got the fax. Please c/b to assist           Can the clinic reply by [facility]:          What Number to Call Back if not in [facility]:

## 2023-01-25 ENCOUNTER — TELEPHONE (OUTPATIENT)
Dept: INTERNAL MEDICINE | Facility: CLINIC | Age: 63
End: 2023-01-25
Payer: COMMERCIAL

## 2023-01-25 NOTE — TELEPHONE ENCOUNTER
Faxed signed RX CREAM FORMS to fax# -[phone]/[facility]# [phone]  . I  Have attached a confirmation sheet to this fax located in my file cabinet.

## 2023-01-26 ENCOUNTER — TELEPHONE (OUTPATIENT)
Dept: INTERNAL MEDICINE | Facility: CLINIC | Age: 63
End: 2023-01-26
Payer: COMMERCIAL

## 2023-01-26 NOTE — TELEPHONE ENCOUNTER
----- Message from Aide Umanzor sent at 1/25/2023 11:16 AM CST -----  Regarding: rx  Name of Who is Calling:[facility]   [phone]          What is the request in detail: Jose states that he called yesterday and still has not heard back. He is calling in regards to rx of Piroxicam 2% cream/  He says that the pt is requesting it to be filled. He states that he faxed it over to you , but has not heard back . Please call [phone] if any questions           Can the clinic reply by [facility]:          What Number to Call Back if not in [facility]:

## 2023-02-27 ENCOUNTER — OFFICE VISIT (OUTPATIENT)
Dept: SLEEP MEDICINE | Facility: CLINIC | Age: 63
End: 2023-02-27
Payer: COMMERCIAL

## 2023-02-27 VITALS
DIASTOLIC BLOOD PRESSURE: 80 MMHG | SYSTOLIC BLOOD PRESSURE: 131 MMHG | HEIGHT: 71 IN | WEIGHT: 214 LBS | BODY MASS INDEX: 29.96 KG/M2 | HEART RATE: 63 BPM | OXYGEN SATURATION: 94 %

## 2023-02-27 DIAGNOSIS — G47.33 OSA (OBSTRUCTIVE SLEEP APNEA): ICD-10-CM

## 2023-02-27 PROCEDURE — 1159F MED LIST DOCD IN RCRD: CPT | Mod: CPTII,S$GLB,, | Performed by: INTERNAL MEDICINE

## 2023-02-27 PROCEDURE — 1160F PR REVIEW ALL MEDS BY PRESCRIBER/CLIN PHARMACIST DOCUMENTED: ICD-10-PCS | Mod: CPTII,S$GLB,, | Performed by: INTERNAL MEDICINE

## 2023-02-27 PROCEDURE — 3044F HG A1C LEVEL LT 7.0%: CPT | Mod: CPTII,S$GLB,, | Performed by: INTERNAL MEDICINE

## 2023-02-27 PROCEDURE — 1159F PR MEDICATION LIST DOCUMENTED IN MEDICAL RECORD: ICD-10-PCS | Mod: CPTII,S$GLB,, | Performed by: INTERNAL MEDICINE

## 2023-02-27 PROCEDURE — 99999 PR PBB SHADOW E&M-EST. PATIENT-LVL III: CPT | Mod: PBBFAC,,, | Performed by: INTERNAL MEDICINE

## 2023-02-27 PROCEDURE — 1160F RVW MEDS BY RX/DR IN RCRD: CPT | Mod: CPTII,S$GLB,, | Performed by: INTERNAL MEDICINE

## 2023-02-27 PROCEDURE — 3008F BODY MASS INDEX DOCD: CPT | Mod: CPTII,S$GLB,, | Performed by: INTERNAL MEDICINE

## 2023-02-27 PROCEDURE — 99204 OFFICE O/P NEW MOD 45 MIN: CPT | Mod: S$GLB,,, | Performed by: INTERNAL MEDICINE

## 2023-02-27 PROCEDURE — 3079F DIAST BP 80-89 MM HG: CPT | Mod: CPTII,S$GLB,, | Performed by: INTERNAL MEDICINE

## 2023-02-27 PROCEDURE — 3075F SYST BP GE 130 - 139MM HG: CPT | Mod: CPTII,S$GLB,, | Performed by: INTERNAL MEDICINE

## 2023-02-27 PROCEDURE — 3079F PR MOST RECENT DIASTOLIC BLOOD PRESSURE 80-89 MM HG: ICD-10-PCS | Mod: CPTII,S$GLB,, | Performed by: INTERNAL MEDICINE

## 2023-02-27 PROCEDURE — 99204 PR OFFICE/OUTPT VISIT, NEW, LEVL IV, 45-59 MIN: ICD-10-PCS | Mod: S$GLB,,, | Performed by: INTERNAL MEDICINE

## 2023-02-27 PROCEDURE — 99999 PR PBB SHADOW E&M-EST. PATIENT-LVL III: ICD-10-PCS | Mod: PBBFAC,,, | Performed by: INTERNAL MEDICINE

## 2023-02-27 PROCEDURE — 3008F PR BODY MASS INDEX (BMI) DOCUMENTED: ICD-10-PCS | Mod: CPTII,S$GLB,, | Performed by: INTERNAL MEDICINE

## 2023-02-27 PROCEDURE — 3044F PR MOST RECENT HEMOGLOBIN A1C LEVEL <7.0%: ICD-10-PCS | Mod: CPTII,S$GLB,, | Performed by: INTERNAL MEDICINE

## 2023-02-27 PROCEDURE — 3075F PR MOST RECENT SYSTOLIC BLOOD PRESS GE 130-139MM HG: ICD-10-PCS | Mod: CPTII,S$GLB,, | Performed by: INTERNAL MEDICINE

## 2023-02-27 NOTE — PROGRESS NOTES
Subjective:   Patient ID: Fabian Ivy is a 62 y.o. male    Chief Complaint: No chief complaint on file.      Referred by Dr. Dieter SHEPARD  Fabian Ivy is a 62 y.o. male  Fabian Ivy was diagnosed with Obstructive Sleep Apnea. Followed with Dr. Grimes previously, last in 2017.    Initial triggers for sleep study include  snoring, daytime hypersomnia and fatigue.      The sleep study was performed on June 28, 2016 and revealed an AHI of 29 RDI of 43 and an SpO2 mary of 80.4 .  BMI 28.    He reports using CPAP nightly at least 4 hours a night. He does not use it when he travels.  Does report benefit.    BT - 9 PM  SOL varies  Awakenings: 2-3 times  WASO: a few minutes  RT: 6:30 AM    Patient Durable Medical Equipment (DME) company is [facility].  Device: LiquidPistonstation 2  Setting:  Auto CPAP 8 - 15 cmH2O    The interface is the Bekah View and it is  comfortable.     According to the patient, the compliance with PAP is 4 hours per night, 7 nights per week.    PAP objective data downloaded from PAP device shows (CPT 83412):  No data available      EPWORTH SLEEPINESS SCALE 2/24/2023   Sitting and reading 1   Watching TV 1   Sitting, inactive in a public place (e.g. a theatre or a meeting) 1   As a passenger in a car for an hour without a break 0   Lying down to rest in the afternoon when circumstances permit 1   Sitting and talking to someone 0   Sitting quietly after a lunch without alcohol 1   In a car, while stopped for a few minutes in traffic 0   Total score 5         Fabian Ivy does feel refreshed upon awakening.   An assessment of daytime sleep tendency reveals that he does not have episodes of inadvertent dozing even when inactive or being sedentary.       Objective:   Vitals reviewed   Physical Exam  Vitals reviewed.   Constitutional:       Appearance: Normal appearance.   HENT:      Nose: Nose normal.   Cardiovascular:      Rate and Rhythm: Normal rate and regular rhythm.   Pulmonary:      Effort:  Pulmonary effort is normal. No respiratory distress.      Breath sounds: No wheezing or rales.   Neurological:      General: No focal deficit present.      Mental Status: He is alert.       Assessment:     Problem List Items Addressed This Visit          Other    TORY (obstructive sleep apnea)    Relevant Orders    CPAP/BIPAP SUPPLIES         Plan:       Obstructive Sleep Apnea:     Patient is on PAP therapy at this time  Self reported good Compliance  Good Efficacy    - Teaching in regards to PAP use and mask adjustment was given to the patient during the visit today.  - Use PAP >4hours per night for 7 nights per week  - Use PAP for any daytime sleeping  - Interface patient chose that was prescribed today: Bekah stacy  -  Prescription for PAP device supplies will be send to the [employer] company today. Filter, mask, tube with climate line and humidification system.  - requested for DME to link Dream station 2 device to [facility]  account.      Patient suffers from a complex medical condition and if sleep disordered breathing is not properly treated it will increase his morbidity and mortality and patient is aware that has to be optimally compliant with therapy. Sleep disordered breathing has been associated with uncontrolled hypertension, insulin resistance, pulmonary hypertension, dementia, glaucoma, cardiac arrhythmias, cerebro vascular accident and multiple other conditions when not treated appropriately. Patient benefits from PAP therapy.    RTC 1 years

## 2023-06-13 ENCOUNTER — PATIENT MESSAGE (OUTPATIENT)
Dept: INTERNAL MEDICINE | Facility: CLINIC | Age: 63
End: 2023-06-13
Payer: MEDICARE

## 2023-06-13 DIAGNOSIS — M79.671 FOOT PAIN, RIGHT: Primary | ICD-10-CM

## 2023-06-19 ENCOUNTER — OFFICE VISIT (OUTPATIENT)
Dept: PODIATRY | Facility: CLINIC | Age: 63
End: 2023-06-19
Attending: INTERNAL MEDICINE
Payer: MEDICARE

## 2023-06-19 ENCOUNTER — APPOINTMENT (OUTPATIENT)
Dept: RADIOLOGY | Facility: OTHER | Age: 63
End: 2023-06-19
Attending: PODIATRIST
Payer: MEDICARE

## 2023-06-19 VITALS
HEIGHT: 71 IN | SYSTOLIC BLOOD PRESSURE: 133 MMHG | HEART RATE: 65 BPM | WEIGHT: 214 LBS | DIASTOLIC BLOOD PRESSURE: 68 MMHG | BODY MASS INDEX: 29.96 KG/M2

## 2023-06-19 DIAGNOSIS — M79.671 RIGHT FOOT PAIN: Primary | ICD-10-CM

## 2023-06-19 DIAGNOSIS — S99.922S FOOT INJURY, LEFT, SEQUELA: ICD-10-CM

## 2023-06-19 DIAGNOSIS — M79.671 FOOT PAIN, RIGHT: ICD-10-CM

## 2023-06-19 DIAGNOSIS — M79.671 RIGHT FOOT PAIN: ICD-10-CM

## 2023-06-19 DIAGNOSIS — M79.672 LEFT FOOT PAIN: Primary | ICD-10-CM

## 2023-06-19 DIAGNOSIS — M72.2 PLANTAR FASCIITIS: ICD-10-CM

## 2023-06-19 PROCEDURE — 73630 X-RAY EXAM OF FOOT: CPT | Mod: 26,RT,, | Performed by: RADIOLOGY

## 2023-06-19 PROCEDURE — 99203 PR OFFICE/OUTPT VISIT, NEW, LEVL III, 30-44 MIN: ICD-10-PCS | Mod: S$PBB,,, | Performed by: PODIATRIST

## 2023-06-19 PROCEDURE — 99999 PR PBB SHADOW E&M-EST. PATIENT-LVL IV: ICD-10-PCS | Mod: PBBFAC,,, | Performed by: PODIATRIST

## 2023-06-19 PROCEDURE — 73630 XR FOOT COMPLETE 3 VIEW RIGHT: ICD-10-PCS | Mod: 26,RT,, | Performed by: RADIOLOGY

## 2023-06-19 PROCEDURE — 73630 X-RAY EXAM OF FOOT: CPT | Mod: TC,RT

## 2023-06-19 PROCEDURE — 99203 OFFICE O/P NEW LOW 30 MIN: CPT | Mod: S$PBB,,, | Performed by: PODIATRIST

## 2023-06-19 PROCEDURE — 99999 PR PBB SHADOW E&M-EST. PATIENT-LVL IV: CPT | Mod: PBBFAC,,, | Performed by: PODIATRIST

## 2023-06-19 PROCEDURE — 99214 OFFICE O/P EST MOD 30 MIN: CPT | Mod: PBBFAC,PN | Performed by: PODIATRIST

## 2023-06-19 RX ORDER — HYDROCODONE BITARTRATE AND ACETAMINOPHEN 10; 325 MG/1; MG/1
1 TABLET ORAL EVERY 12 HOURS PRN
COMMUNITY
Start: 2023-06-07
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
## 2023-07-17 ENCOUNTER — HOSPITAL ENCOUNTER (EMERGENCY)
Facility: OTHER | Age: 63
Discharge: HOME | End: 2023-07-17
Attending: EMERGENCY MEDICINE
Payer: MEDICARE

## 2023-07-17 VITALS
OXYGEN SATURATION: 98 % | HEART RATE: 57 BPM | HEIGHT: 71 IN | BODY MASS INDEX: 29.96 KG/M2 | TEMPERATURE: 98 F | DIASTOLIC BLOOD PRESSURE: 89 MMHG | SYSTOLIC BLOOD PRESSURE: 169 MMHG | RESPIRATION RATE: 13 BRPM | WEIGHT: 214 LBS

## 2023-07-17 DIAGNOSIS — R52 PAIN: Primary | ICD-10-CM

## 2023-07-17 DIAGNOSIS — I10 HYPERTENSION: ICD-10-CM

## 2023-07-17 LAB
ALBUMIN SERPL BCP-MCNC: 4.1 G/DL (ref 3.5–5.2)
ALP SERPL-CCNC: 67 U/L (ref 55–135)
ALT SERPL W/O P-5'-P-CCNC: 34 U/L (ref 10–44)
ANION GAP SERPL CALC-SCNC: 11 MMOL/L (ref 8–16)
AST SERPL-CCNC: 27 U/L (ref 10–40)
BASOPHILS # BLD AUTO: 0.05 K/UL (ref 0–0.2)
BASOPHILS NFR BLD: 0.6 % (ref 0–1.9)
BILIRUB SERPL-MCNC: 0.3 MG/DL (ref 0.1–1)
BILIRUB UR QL STRIP: NEGATIVE
BUN SERPL-MCNC: 18 MG/DL (ref 8–23)
CALCIUM SERPL-MCNC: 9.2 MG/DL (ref 8.7–10.5)
CHLORIDE SERPL-SCNC: 105 MMOL/L (ref 95–110)
CK SERPL-CCNC: 238 U/L (ref 20–200)
CLARITY UR: CLEAR
CO2 SERPL-SCNC: 25 MMOL/L (ref 23–29)
COLOR UR: COLORLESS
CREAT SERPL-MCNC: 0.9 MG/DL (ref 0.5–1.4)
DIFFERENTIAL METHOD: ABNORMAL
EOSINOPHIL # BLD AUTO: 0.2 K/UL (ref 0–0.5)
EOSINOPHIL NFR BLD: 3 % (ref 0–8)
ERYTHROCYTE [DISTWIDTH] IN BLOOD BY AUTOMATED COUNT: 13.1 % (ref 11.5–14.5)
EST. GFR  (NO RACE VARIABLE): >60 ML/MIN/1.73 M^2
GLUCOSE SERPL-MCNC: 105 MG/DL (ref 70–110)
GLUCOSE UR QL STRIP: NEGATIVE
HCT VFR BLD AUTO: 42.2 % (ref 40–54)
HGB BLD-MCNC: 13.9 G/DL (ref 14–18)
HGB UR QL STRIP: NEGATIVE
IMM GRANULOCYTES # BLD AUTO: 0.02 K/UL (ref 0–0.04)
IMM GRANULOCYTES NFR BLD AUTO: 0.3 % (ref 0–0.5)
KETONES UR QL STRIP: NEGATIVE
LEUKOCYTE ESTERASE UR QL STRIP: NEGATIVE
LYMPHOCYTES # BLD AUTO: 2.3 K/UL (ref 1–4.8)
LYMPHOCYTES NFR BLD: 30.1 % (ref 18–48)
MCH RBC QN AUTO: 32.3 PG (ref 27–31)
MCHC RBC AUTO-ENTMCNC: 32.9 G/DL (ref 32–36)
MCV RBC AUTO: 98 FL (ref 82–98)
MONOCYTES # BLD AUTO: 0.6 K/UL (ref 0.3–1)
MONOCYTES NFR BLD: 7.1 % (ref 4–15)
NEUTROPHILS # BLD AUTO: 4.5 K/UL (ref 1.8–7.7)
NEUTROPHILS NFR BLD: 58.9 % (ref 38–73)
NITRITE UR QL STRIP: NEGATIVE
NRBC BLD-RTO: 0 /100 WBC
PH UR STRIP: 7 [PH] (ref 5–8)
PLATELET # BLD AUTO: 204 K/UL (ref 150–450)
PMV BLD AUTO: 10.2 FL (ref 9.2–12.9)
POTASSIUM SERPL-SCNC: 4 MMOL/L (ref 3.5–5.1)
PROT SERPL-MCNC: 7.3 G/DL (ref 6–8.4)
PROT UR QL STRIP: NEGATIVE
RBC # BLD AUTO: 4.31 M/UL (ref 4.6–6.2)
SODIUM SERPL-SCNC: 141 MMOL/L (ref 136–145)
SP GR UR STRIP: 1 (ref 1–1.03)
URN SPEC COLLECT METH UR: ABNORMAL
UROBILINOGEN UR STRIP-ACNC: NEGATIVE EU/DL
WBC # BLD AUTO: 7.71 K/UL (ref 3.9–12.7)

## 2023-07-17 PROCEDURE — 85025 COMPLETE CBC W/AUTO DIFF WBC: CPT | Performed by: NURSE PRACTITIONER

## 2023-07-17 PROCEDURE — 93010 EKG 12-LEAD: ICD-10-PCS | Mod: ,,, | Performed by: INTERNAL MEDICINE

## 2023-07-17 PROCEDURE — 82550 ASSAY OF CK (CPK): CPT | Performed by: NURSE PRACTITIONER

## 2023-07-17 PROCEDURE — 93005 ELECTROCARDIOGRAM TRACING: CPT

## 2023-07-17 PROCEDURE — 99284 EMERGENCY DEPT VISIT MOD MDM: CPT

## 2023-07-17 PROCEDURE — 25000003 PHARM REV CODE 250: Performed by: NURSE PRACTITIONER

## 2023-07-17 PROCEDURE — 93010 ELECTROCARDIOGRAM REPORT: CPT | Mod: ,,, | Performed by: INTERNAL MEDICINE

## 2023-07-17 PROCEDURE — 81003 URINALYSIS AUTO W/O SCOPE: CPT | Performed by: NURSE PRACTITIONER

## 2023-07-17 PROCEDURE — 80053 COMPREHEN METABOLIC PANEL: CPT | Performed by: NURSE PRACTITIONER

## 2023-07-17 RX ORDER — HYDROCHLOROTHIAZIDE 25 MG/1
25 TABLET ORAL
Status: DISCONTINUED | OUTPATIENT
Start: 2023-07-17 | End: 2023-07-17

## 2023-07-17 RX ORDER — OXYCODONE AND ACETAMINOPHEN 7.5; 325 MG/1; MG/1
1 TABLET ORAL EVERY 4 HOURS PRN
Status: DISCONTINUED | OUTPATIENT
Start: 2023-07-17 | End: 2023-07-17 | Stop reason: HOSPADM

## 2023-07-17 RX ADMIN — OXYCODONE AND ACETAMINOPHEN 1 TABLET: 7.5; 325 TABLET ORAL at 08:07

## 2023-07-17 NOTE — ED TRIAGE NOTES
Pt presents to ED c/o HTN after oral surgery today. Pt reports compliance with BP medications. Took BP meds with an extra dose approx 30 min PTA. Pt denies any dizziness, blurred vision, headache, or any other symptoms.

## 2023-07-18 NOTE — ED PROVIDER NOTES
Source of History:  Patient    Chief complaint:  Hypertension (Presents to the ED with c/o htn after oral sx today. Has hx of htn, reports taking prescribed BP meds 30 min pta. Also endorses taking an extra pill. Denies weakness, dizziness, blurred vision, HA.)      HPI:  Fabian Ivy is a 63 y.o. male with past medical history of hypertension presenting with elevated blood pressure reading.  Patient had dental surgery today.  He states that he had a bone graft and 2 extractions.  He was given Valium and local anesthesia.  He said the procedures really rough.  He was post to potentially get implants placed today but states the dentist was unable to complete the procedure because his blood pressure was too high and he was continuing to bleed.  He states his blood pressure is normally with a systolic in the 130s.  As his blood pressure was significantly elevated, he presented to the emergency room.  He denies headache, visual changes, dizziness, lightheadedness, chest pain and shortness of breath.  He also denies significant amount of pain.  He states he was given Norco and took half a tablet prior to arrival.  For his blood pressure takes valsartan 40 mg daily.  He states that we got home from the dental procedure he took an extra dose.  Patient also states that he has been outside working everyday on the outside of his house.    This is the extent to the patients complaints today here in the emergency department.    ROS: As per HPI     Review of patient's allergies indicates:  No Known Allergies    PMH:  As per HPI and below:  Past Medical History:   Diagnosis Date    Hepatitis C     s/p harvone    Hypertension      History reviewed. No pertinent surgical history.    Social History     Tobacco Use    Smoking status: Former     Packs/day: 0.50     Years: 35.00     Pack years: 17.50     Types: Cigarettes     Quit date: 2017     Years since quittin.4    Smokeless tobacco: Former    Tobacco comments:      "nicorette gum   Substance Use Topics    Alcohol use: No    Drug use: No       Physical Exam:    BP (!) 169/89   Pulse (!) 57   Temp 97.9 °F (36.6 °C) (Oral)   Resp 13   Ht 5' 11" (1.803 m)   Wt 97.1 kg (214 lb)   SpO2 98%   BMI 29.85 kg/m²   Nursing note and vital signs reviewed.  Appearance: No acute distress.  Eyes: No conjunctival injection.  ENT: Oropharynx clear.  Right lower dentition with evidence of extraction, bone graft and sutures in place with minimal amount of bleeding.  Normal phonation  Chest/ Respiratory: Clear to auscultation bilaterally.  Good air movement.  No wheezes.  No rhonchi. No rales. No accessory muscle use.  Cardiovascular: Regular rate and rhythm.  No murmurs. No gallops. No rubs.  Abdomen: Soft.  Not distended.  Nontender.  No guarding.  No rebound. Non-peritoneal.  Musculoskeletal: Good range of motion all joints.  No deformities.  Neck supple.  No meningismus.  Skin: No rashes seen.  Good turgor.  No abrasions.  No ecchymoses.  Neurologic: Motor intact.  Sensation intact.  Cerebellar intact.  Cranial nerves intact.  Mental Status:  Alert and oriented x 3.  Appropriate, conversant    Labs that have been ordered have been independently reviewed and interpreted by myself.        Labs Reviewed   CBC W/ AUTO DIFFERENTIAL - Abnormal; Notable for the following components:       Result Value    RBC 4.31 (*)     Hemoglobin 13.9 (*)     MCH 32.3 (*)     All other components within normal limits   URINALYSIS, REFLEX TO URINE CULTURE - Abnormal; Notable for the following components:    Color, UA Colorless (*)     All other components within normal limits    Narrative:     Specimen Source->Urine   CK - Abnormal; Notable for the following components:     (*)     All other components within normal limits   COMPREHENSIVE METABOLIC PANEL       Imaging Results    None         Initial Impression/ Differential Dx:  Urgent evaluation of 63 y.o. male presenting with asymptomatic hypertension. " Patient is significantly hypertensive.  Chart review shows that patient normally has a systolic that is in the 130s.  Patient is asymptomatic, do not suspect ACS or CVA.  Will obtain basic labs to assess for any electrolyte abnormalities, signs of end-organ damage, as patient states he has been outside in the sun all day, rhabdomyolysis is consideration.  As patient medical procedure exam, medication reaction possible as well as postoperative pain although patient states pain well controlled    Differential Diagnosis includes, but is not limited to:  Hypertensive urgency, hypertensive emergency, Hypertensive encephalopathy, CVA/TIA, hypertensive nephropathy, anxiety, drug abuse/intoxication, essential hypertension, kidney failure, rhabdomyolysis, an      EKG independently interpreted myself shows sinus bradycardia at a rate of 56 beats per minute, normal intervals, no STEMI    MDM:        ED Course as of 07/17/23 2152 Mon Jul 17, 2023 1957 CBC auto differential(!)  No leukocytosis, grossly normal H&H [CU]   2004 Comprehensive metabolic panel [CU]   2005 Urinalysis, Reflex to Urine Culture Urine, Clean Catch(!)  No proteinuria [CU]   2005 CPK(!): 238  Very mild elevation [CU]   2005 No evidence of end-organ damage lab work [CU]   2023 At bedside to reassess patient and inform him of his lab results.  Upon further discussion, patient although he denied pain earlier states that he is in a substantial amount of pain.  Counseled patient that pain can cause elevated blood pressure.  Will give Percocet and continue to reassess [CU]   2058 178/97.  I suspect elevated blood pressure may be secondary to pain.  Patient states the procedure was very traumatic as he only had local and he could hear them working and cracking his bones.  After pain medicine, blood pressure significantly improved.  I do not feel as though further intervention is indicated at this time and patient can follow up with PCP for further BP management  if indicated.  Patient has Norco 10s at home. Patient educated on signs and symptoms to monitor for and when to return to ED. Patient verbalized understanding agrees with treatment plan. All questions and concerns addressed.      [CU]      ED Course User Index  [CU] Oskar Chaudhry NP                   Diagnostic Impression:    1. Pain    2. Hypertension         ED Disposition Condition    Discharge Stable            ED Prescriptions    None       Follow-up Information       Follow up With Specialties Details Why Contact Info    Dieter Rice MD Internal Medicine Schedule an appointment as soon as possible for a visit   [address] [address]  [phone]               Oskar Chaudhry NP  07/17/23 0897

## 2023-07-19 ENCOUNTER — OFFICE VISIT (OUTPATIENT)
Dept: INTERNAL MEDICINE | Facility: CLINIC | Age: 63
End: 2023-07-19
Attending: INTERNAL MEDICINE
Payer: MEDICARE

## 2023-07-19 VITALS
WEIGHT: 218.5 LBS | HEIGHT: 71 IN | BODY MASS INDEX: 30.59 KG/M2 | DIASTOLIC BLOOD PRESSURE: 66 MMHG | HEART RATE: 70 BPM | OXYGEN SATURATION: 96 % | SYSTOLIC BLOOD PRESSURE: 116 MMHG

## 2023-07-19 DIAGNOSIS — Z87.891 FORMER SMOKER: ICD-10-CM

## 2023-07-19 DIAGNOSIS — I10 BENIGN ESSENTIAL HYPERTENSION: Primary | ICD-10-CM

## 2023-07-19 PROCEDURE — 99999 PR PBB SHADOW E&M-EST. PATIENT-LVL III: ICD-10-PCS | Mod: PBBFAC,,, | Performed by: INTERNAL MEDICINE

## 2023-07-19 PROCEDURE — 99214 OFFICE O/P EST MOD 30 MIN: CPT | Mod: S$PBB,,, | Performed by: INTERNAL MEDICINE

## 2023-07-19 PROCEDURE — 99999 PR PBB SHADOW E&M-EST. PATIENT-LVL III: CPT | Mod: PBBFAC,,, | Performed by: INTERNAL MEDICINE

## 2023-07-19 PROCEDURE — 99214 PR OFFICE/OUTPT VISIT, EST, LEVL IV, 30-39 MIN: ICD-10-PCS | Mod: S$PBB,,, | Performed by: INTERNAL MEDICINE

## 2023-07-19 PROCEDURE — 99213 OFFICE O/P EST LOW 20 MIN: CPT | Mod: PBBFAC | Performed by: INTERNAL MEDICINE

## 2023-07-19 RX ORDER — CHLORHEXIDINE GLUCONATE ORAL RINSE 1.2 MG/ML
SOLUTION DENTAL
COMMUNITY
Start: 2023-06-27

## 2023-07-19 RX ORDER — AZITHROMYCIN 250 MG/1
250 TABLET, FILM COATED ORAL
COMMUNITY
Start: 2023-06-27

## 2023-07-19 NOTE — PROGRESS NOTES
Subjective:       Patient ID: Fabian Ivy is a 63 y.o. male.    Chief Complaint: No chief complaint on file.    Here for urgent visit    2 days prior had tooth extracted and BP was elevated before and during procedure. BP remained 220/110 so went to ED. BP improved. Keeping BP monitored at home. His exercise is essential to his BP control. Feels well. Getting back to regular exercise to help control his BP in addition to valsartan.    Lost nearly 25 lb with regular consistent exercise.  In the past we discovered recumbent bike does not irritate his recurrent lumbar go and is quite effective.       ### HTN ###  Valsartan 40 mg   Typically sees 120-low 130s/70-low80s. Denies frequent or current HA, intermittent blurry vision, dizziness, CP, or SOB.     ### Former Smoker ###  CT lung screening offered.  Patient would like to defer discussion to next year. Pt denies SOB at rest or ROD, PND, chest tightness, wheezing, chronic cough, hemoptysis, night sweats, or unexpected weight loss.     ### lumbar and cervical DJD ###  Intermittent low back pain, bilateral radiation down buttocks and stops there.   low back bilateral hip pain that radiates own the sides of his thighs.      ### HLD ###  liptior 40mg        Review of Systems   Constitutional:  Negative for appetite change, chills, fever and unexpected weight change.   HENT:  Negative for hearing loss, sore throat and trouble swallowing.    Eyes:  Negative for visual disturbance.   Respiratory:  Negative for cough, chest tightness and shortness of breath.    Cardiovascular:  Negative for chest pain and leg swelling.   Gastrointestinal:  Negative for abdominal pain, blood in stool, constipation, diarrhea, nausea and vomiting.   Endocrine: Negative for polydipsia and polyuria.   Genitourinary:  Negative for decreased urine volume, difficulty urinating, dysuria, frequency and urgency.   Musculoskeletal:  Negative for gait problem.   Skin:  Negative for rash.   Neurological:   "Negative for dizziness and numbness.   Psychiatric/Behavioral:  The patient is not nervous/anxious.      Objective:      Vitals:    07/19/23 0959   BP: 116/66   Pulse: 70   SpO2: 96%   Weight: 99.1 kg (218 lb 7.6 oz)   Height: 5' 11" (1.803 m)      Physical Exam  Vitals and nursing note reviewed.   Constitutional:       General: He is not in acute distress.     Appearance: Normal appearance. He is well-developed.   HENT:      Head: Normocephalic and atraumatic.      Mouth/Throat:      Pharynx: No oropharyngeal exudate.   Eyes:      General: No scleral icterus.     Conjunctiva/sclera: Conjunctivae normal.      Pupils: Pupils are equal, round, and reactive to light.   Neck:      Thyroid: No thyromegaly.   Cardiovascular:      Rate and Rhythm: Normal rate and regular rhythm.      Heart sounds: Normal heart sounds. No murmur heard.  Pulmonary:      Effort: Pulmonary effort is normal.      Breath sounds: Normal breath sounds. No wheezing or rales.   Abdominal:      General: There is no distension.   Musculoskeletal:         General: No tenderness.   Lymphadenopathy:      Cervical: No cervical adenopathy.   Skin:     General: Skin is warm and dry.   Neurological:      Mental Status: He is alert and oriented to person, place, and time.   Psychiatric:         Behavior: Behavior normal.       Assessment:       1. Benign essential hypertension    2. Former smoker        Plan:       Diagnoses and all orders for this visit:    Benign essential hypertension   Controlled and asymptomatic.  Continue current Rx regimen.    Former smoker   Patient congratulated and continued cessation encouraged. Will continue to monitor and assist.           Dieter Singh MD  Internal Medicine-[facility]        Side effects of medication(s) were discussed in detail and patient voiced understanding.  Patient will call back for any issues or complications.     "

## 2023-09-05 ENCOUNTER — PATIENT MESSAGE (OUTPATIENT)
Dept: INTERNAL MEDICINE | Facility: CLINIC | Age: 63
End: 2023-09-05
Payer: MEDICARE

## 2023-09-08 ENCOUNTER — PATIENT MESSAGE (OUTPATIENT)
Dept: INTERNAL MEDICINE | Facility: CLINIC | Age: 63
End: 2023-09-08
Payer: MEDICARE

## 2023-09-21 ENCOUNTER — OFFICE VISIT (OUTPATIENT)
Dept: INTERNAL MEDICINE | Facility: CLINIC | Age: 63
End: 2023-09-21
Payer: MEDICARE

## 2023-09-21 VITALS
HEART RATE: 67 BPM | DIASTOLIC BLOOD PRESSURE: 82 MMHG | BODY MASS INDEX: 31.05 KG/M2 | OXYGEN SATURATION: 97 % | HEIGHT: 71 IN | SYSTOLIC BLOOD PRESSURE: 148 MMHG | WEIGHT: 221.81 LBS

## 2023-09-21 DIAGNOSIS — M77.11 LATERAL EPICONDYLITIS OF RIGHT ELBOW: Primary | ICD-10-CM

## 2023-09-21 PROCEDURE — 99213 OFFICE O/P EST LOW 20 MIN: CPT | Mod: PBBFAC | Performed by: STUDENT IN AN ORGANIZED HEALTH CARE EDUCATION/TRAINING PROGRAM

## 2023-09-21 PROCEDURE — 99213 OFFICE O/P EST LOW 20 MIN: CPT | Mod: S$PBB,,, | Performed by: STUDENT IN AN ORGANIZED HEALTH CARE EDUCATION/TRAINING PROGRAM

## 2023-09-21 PROCEDURE — 99999 PR PBB SHADOW E&M-EST. PATIENT-LVL III: CPT | Mod: PBBFAC,,, | Performed by: STUDENT IN AN ORGANIZED HEALTH CARE EDUCATION/TRAINING PROGRAM

## 2023-09-21 PROCEDURE — 99213 PR OFFICE/OUTPT VISIT, EST, LEVL III, 20-29 MIN: ICD-10-PCS | Mod: S$PBB,,, | Performed by: STUDENT IN AN ORGANIZED HEALTH CARE EDUCATION/TRAINING PROGRAM

## 2023-09-21 PROCEDURE — 99999 PR PBB SHADOW E&M-EST. PATIENT-LVL III: ICD-10-PCS | Mod: PBBFAC,,, | Performed by: STUDENT IN AN ORGANIZED HEALTH CARE EDUCATION/TRAINING PROGRAM

## 2023-09-21 RX ORDER — DICLOFENAC SODIUM 10 MG/G
2 GEL TOPICAL 2 TIMES DAILY
Qty: 450 G | Refills: 2 | Status: SHIPPED | OUTPATIENT
Start: 2023-09-21 | End: 2024-09-20

## 2023-09-21 NOTE — PROGRESS NOTES
Subjective:       Patient ID: Fabian Ivy is a 63 y.o. male.    Chief Complaint: No chief complaint on file.    Elbow Injury  This is a new problem. The current episode started more than 1 month ago. The problem occurs constantly. The problem has been unchanged. Pertinent negatives include no abdominal pain, chest pain, chills, coughing, fever, nausea, numbness, vomiting or weakness. The symptoms are aggravated by bending (and straightening out the elbow.). Treatments tried: hydrocodone 5mg 1-2x per day. Topical diclofenac. The treatment provided mild relief.     Bumped right elbow on kitchen countertop. Swelling on lateral side of right elbow, has remained unchanged. Still has good ROM, but has soreness in lateral elbow. Works a lot with his hands, on his cars, motorcycle, property, boats, heavy lifting.     All of your core healthy metrics are met.      Social History     Social History Narrative     x 2.  Adopted daughter.  Occup:  Self employed investing, real estate.         Family History   Problem Relation Age of Onset    Cancer Father         lung       Current Outpatient Medications:     atorvastatin (LIPITOR) 40 MG tablet, Take 1 tablet (40 mg total) by mouth once daily., Disp: 90 tablet, Rfl: 3    chlorhexidine (PERIDEX) 0.12 % solution, SMARTSIG:By Mouth, Disp: , Rfl:     cyclobenzaprine (FLEXERIL) 10 MG tablet, Take 10 mg by mouth 3 (three) times daily as needed., Disp: , Rfl:     HYDROcodone-acetaminophen (NORCO)  mg per tablet, Take 1 tablet by mouth every 12 (twelve) hours as needed., Disp: , Rfl:     ketoconazole (NIZORAL) 2 % cream, Apply topically 2 (two) times daily. Use for minimum of 2 weeks, Disp: 15 g, Rfl: 0    valsartan (DIOVAN) 40 MG tablet, Take 1 tablet (40 mg total) by mouth once daily., Disp: 90 tablet, Rfl: 3    azithromycin (Z-ANALI) 250 MG tablet, Take 250 mg by mouth., Disp: , Rfl:     diclofenac sodium (VOLTAREN) 1 % Gel, Apply 2 g topically 2 (two) times daily., Disp:  "450 g, Rfl: 2    PFIZER COVID-19 BUCK VACCN,PF, 30 mcg/0.3 mL injection, , Disp: , Rfl:     Review of Systems   Constitutional:  Negative for chills and fever.   Respiratory:  Negative for cough.    Cardiovascular:  Negative for chest pain.   Gastrointestinal:  Negative for abdominal pain, nausea and vomiting.   Musculoskeletal:         Pain in lateral right elbow   Neurological:  Negative for weakness and numbness.       Objective:   BP (!) 148/82   Pulse 67   Ht 5' 11" (1.803 m)   Wt 100.6 kg (221 lb 12.5 oz)   SpO2 97%   BMI 30.93 kg/m²      Physical Exam  Vitals and nursing note reviewed.   Constitutional:       General: He is not in acute distress.     Appearance: Normal appearance. He is not ill-appearing.   Eyes:      General:         Right eye: No discharge.         Left eye: No discharge.      Conjunctiva/sclera: Conjunctivae normal.   Cardiovascular:      Rate and Rhythm: Normal rate and regular rhythm.   Pulmonary:      Effort: Pulmonary effort is normal. No respiratory distress.      Breath sounds: Normal breath sounds. No wheezing.   Abdominal:      Palpations: Abdomen is soft.      Tenderness: There is no abdominal tenderness.   Musculoskeletal:         General: Swelling (mild swelling to L lateral epicondyle) and tenderness (to lateral epicondyle of elbow) present. No deformity. Normal range of motion.      Comments: Pain in lateral elbow with resisted wrist flexion   Neurological:      Mental Status: He is alert.   Psychiatric:         Behavior: Behavior normal.         Assessment & Plan   1. Lateral epicondylitis of right elbow  -discussed trial of activity modification and reduction with R extremity, icing and topical NSAIDs. Discussed taking his pain medications as he previously did for his chronic neck pain, but no more than previously for this elbow pain, he verbalized understanding. Advised to return in 2-3 months if no improvement with management below.  - Ambulatory referral/consult to " Physical/Occupational Therapy; Future  - diclofenac sodium (VOLTAREN) 1 % Gel; Apply 2 g topically 2 (two) times daily.  Dispense: 450 g; Refill: 2    Follow up if symptoms worsen or fail to improve.    Disclaimer:  This note may have been prepared using voice recognition software, it may have not been extensively proofed, as such there could be errors within the text such as sound alike errors.

## 2023-10-27 ENCOUNTER — TELEPHONE (OUTPATIENT)
Dept: SPORTS MEDICINE | Facility: CLINIC | Age: 63
End: 2023-10-27
Payer: MEDICARE

## 2023-10-27 NOTE — TELEPHONE ENCOUNTER
I called the patient twice and the phone went to voicemail both times. I left a message encouraging him to return the call to discuss what he is being referred for before getting an appointment scheduled to make sure he is needing to be treated for something that Dr. Martínez sees.

## 2023-10-27 NOTE — TELEPHONE ENCOUNTER
Lizzeth Crandall Staff  Caller: Pt [phone] (Today, 12:21 PM)  Pt is calling stating he was referred from Dr. Romano and wants to schedule please call

## 2023-10-27 NOTE — TELEPHONE ENCOUNTER
----- Message from Carmen Erickson sent at 10/27/2023  3:03 PM CDT -----  Regarding: PT'S RETURNING A CALL FROM SCOTTY  Contact: pt  Pt is needing to be seen for elbow pain..     Confirmed contact info below:  Contact Name: Fabian Ivy  Phone Number: [phone]

## 2023-10-30 ENCOUNTER — HOSPITAL ENCOUNTER (OUTPATIENT)
Dept: RADIOLOGY | Facility: HOSPITAL | Age: 63
Discharge: HOME | End: 2023-10-30
Attending: ORTHOPAEDIC SURGERY
Payer: MEDICARE

## 2023-10-30 ENCOUNTER — OFFICE VISIT (OUTPATIENT)
Dept: SPORTS MEDICINE | Facility: CLINIC | Age: 63
End: 2023-10-30
Payer: MEDICARE

## 2023-10-30 VITALS
WEIGHT: 229.25 LBS | BODY MASS INDEX: 31.98 KG/M2 | HEART RATE: 60 BPM | SYSTOLIC BLOOD PRESSURE: 145 MMHG | DIASTOLIC BLOOD PRESSURE: 90 MMHG

## 2023-10-30 DIAGNOSIS — M77.11 RIGHT LATERAL EPICONDYLITIS: Primary | ICD-10-CM

## 2023-10-30 DIAGNOSIS — M25.521 RIGHT ELBOW PAIN: ICD-10-CM

## 2023-10-30 PROCEDURE — 99204 OFFICE O/P NEW MOD 45 MIN: CPT | Mod: S$PBB,,, | Performed by: ORTHOPAEDIC SURGERY

## 2023-10-30 PROCEDURE — 73080 X-RAY EXAM OF ELBOW: CPT | Mod: 26,RT,, | Performed by: RADIOLOGY

## 2023-10-30 PROCEDURE — 99204 PR OFFICE/OUTPT VISIT, NEW, LEVL IV, 45-59 MIN: ICD-10-PCS | Mod: S$PBB,,, | Performed by: ORTHOPAEDIC SURGERY

## 2023-10-30 PROCEDURE — 99999 PR PBB SHADOW E&M-EST. PATIENT-LVL III: ICD-10-PCS | Mod: PBBFAC,,, | Performed by: ORTHOPAEDIC SURGERY

## 2023-10-30 PROCEDURE — 99213 OFFICE O/P EST LOW 20 MIN: CPT | Mod: PBBFAC | Performed by: ORTHOPAEDIC SURGERY

## 2023-10-30 PROCEDURE — 73080 XR ELBOW COMPLETE 3 VIEW RIGHT: ICD-10-PCS | Mod: 26,RT,, | Performed by: RADIOLOGY

## 2023-10-30 PROCEDURE — 73080 X-RAY EXAM OF ELBOW: CPT | Mod: TC,RT

## 2023-10-30 PROCEDURE — 99999 PR PBB SHADOW E&M-EST. PATIENT-LVL III: CPT | Mod: PBBFAC,,, | Performed by: ORTHOPAEDIC SURGERY

## 2023-10-30 NOTE — PROGRESS NOTES
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 patient who reports to clinic with right lateral elbow pain.      Today the patient rates pain at a 8/10 on visual analog scale.      3 months duration, he notes getting bumped in the elbow with a broom and then the pain started a week later so he is unsure if it is related to his pain     He was seen by his PCP and recommended he do nothing and it would go away on its own     His pain has not improved and it makes sleeping difficult      Affecting ADLS    Worse when doing activites: lifting any items, especially heavy objects, pain with turning door handles     He has been using ice, diclofenac topical     SANE: 30    Review of Systems   Constitution: Negative. Negative for chills, fever and night sweats.   HENT: Negative for congestion and headaches.    Eyes: Negative for blurred vision, left vision loss and right vision loss.   Cardiovascular: Negative for chest pain and syncope.   Respiratory: Negative for cough and shortness of breath.    Endocrine: Negative for polydipsia, polyphagia and polyuria.   Hematologic/Lymphatic: Negative for bleeding problem. Does not bruise/bleed easily.   Skin: Negative for dry skin, itching and rash.   Musculoskeletal: Negative for falls and muscle weakness.   Gastrointestinal: Negative for abdominal pain and bowel incontinence.   Genitourinary: Negative for bladder incontinence and nocturia.   Neurological: Negative for disturbances in coordination, loss of balance and seizures.   Psychiatric/Behavioral: Negative for depression. The patient does not have insomnia.    Allergic/Immunologic: Negative for hives and persistent infections.     PAST MEDICAL HISTORY:   Past Medical History:   Diagnosis Date    Hepatitis C     s/p harvone    Hypertension       PAST SURGICAL HISTORY: History reviewed. No pertinent surgical history.  FAMILY HISTORY:   Family History   Problem Relation Age of Onset    Cancer Father         lung     SOCIAL HISTORY:   Social History     Socioeconomic History    Marital status:    Tobacco Use    Smoking status: Former     Current packs/day: 0.00     Average packs/day: 0.5 packs/day for 35.0 years (17.5 ttl pk-yrs)     Types: Cigarettes     Start date: 1982     Quit date: 2017     Years since quittin.7    Smokeless tobacco: Former    Tobacco comments:     nicorette gum   Substance and Sexual Activity    Alcohol use: No    Drug use: No    Sexual activity: Yes     Partners: Female   Social History Narrative     x 2.  Adopted daughter.  Occup:  Self employed investing, real estate.         MEDICATIONS:   Current Outpatient Medications:     atorvastatin (LIPITOR) 40 MG tablet, Take 1 tablet (40 mg total) by mouth once daily., Disp: 90 tablet, Rfl: 3    cyclobenzaprine (FLEXERIL) 10 MG tablet, Take 10 mg by mouth 3 (three) times daily as needed., Disp: , Rfl:     azithromycin (Z-ANALI) 250 MG tablet, Take 250 mg by mouth., Disp: , Rfl:     chlorhexidine (PERIDEX) 0.12 % solution, SMARTSIG:By Mouth, Disp: , Rfl:     diclofenac sodium (VOLTAREN) 1 % Gel, Apply 2 g topically 2 (two) times daily. (Patient not taking: Reported on 10/30/2023), Disp: 450 g, Rfl: 2    HYDROcodone-acetaminophen (NORCO)  mg per tablet, Take 1 tablet by mouth every 12 (twelve) hours as needed., Disp: , Rfl:     ketoconazole (NIZORAL) 2 % cream, Apply topically 2 (two) times daily. Use for minimum of 2 weeks, Disp: 15 g, Rfl: 0    PFIZER COVID-19 BUCK VACCN,PF, 30 mcg/0.3 mL injection, , Disp: , Rfl:     valsartan (DIOVAN) 40 MG tablet, Take 1 tablet (40 mg total) by mouth once daily., Disp: 90 tablet, Rfl: 3  ALLERGIES: Review of patient's allergies indicates:  No Known Allergies    VITAL SIGNS: BP (!) 145/90   Pulse 60   Wt 104 kg (229  lb 4.5 oz)   BMI 31.98 kg/m²        PHYSICAL EXAM:  General: Patient appears alert and oriented x 3.  Mood is pleasant.  Observation of ears, eyes and nose reveal no gross abnormalities. Observation of ears, eyes and nose reveal no gross abnormalities.  HEENT: NCAT, sclera nonicteric.  Well nourished, in no acute distress and ambulates with a non-antalgic gait with no assistive devices.    Skin: Skin intact bilaterally. Sensation intact bilaterally. Compartments soft. No evidence of edema, infection, or induration.     Right ELBOW / WRIST EXTREMITY EXAM:    OBSERVATION / INSPECTION    Swelling  none    Deformity  none  Discoloration  none     Scars   none    Atrophy  none    TENDERNESS / CREPITUS (T / C):           T / C        Medial epicondyle   - / -    Med. (Ulnar) collateral ligament  - / -    Flexor pronator Musculature   - / -   Biceps tendon    - / -   Head of radius    - / -    Lateral epicondyle   + / -    Extensor Musculature   + / -   Brachioradialis    - / -   Triceps tendon   - / -   Triceps muscle   - / -   Olecranon    - / -   Olecranon bursa   - / -   Cubital fossa    - / -   Anterior jointline   - / -   Radial tunnel    - / -             ROM: ('*' = with pain)    Right Elbow  AROM (PROM)     Extension   0 deg  (5 deg)   Flexion   135 deg (135 deg)         Pronation  90 deg  (90 deg)      Supination   80 deg  (80 deg)                 Left Elbow  AROM (PROM)     Extension   0 deg  (5 deg)   Flexion   145 deg (145 deg)         Pronation  90 deg  (90 deg)      Supination   80 deg  (80 deg)            Right Wrist  AROM (PROM)   Extension   80 deg (85 deg)   Flexion   80 deg (85 deg)         Ulnar Deviation   35 deg (40 deg)  Radial Deviation 35 deg (40 deg)             Left Wrist   AROM (PROM)     Extension   80 deg (85 deg)   Flexion   80 deg (85 deg)         Ulnar Deviation   35 deg (40 deg)  Radial Deviation 35 deg (40 deg)        STRENGTH: ('*' = with pain)    Elbow Flexion:   5/5  Elbow  Extension:  5/5  Wrist Flexion:   5/5  Wrist Extension:  5/5  :    5/5  Intrinsics:   5/5  EPL (Ext. pollicis longus): 5/5  Pinch Mechanism:  5/5    ELBOW EXAMINATION:  See above noted areas of tenderness.   Test for Ligamentous Instability - UCL normal  Test for Ligamentous Instability - LUCL normal  PLRI       neg  Tinel's (Percussion) Test - Cubital  neg  Tennis Elbow Test    +  Golfer's Elbow Test    neg  Radial Capitellar Grind Test   neg  Valgus/Extension Overload Test  neg  Resisted Long Finger Extension Pain +  Moving Valgus     neg  Forearm pain with resisted supination neg    WRIST EXAMINATION:  See above noted areas of tenderness.   Finkelstein's Test   neg  Tinel's Test - Carpal Tunnel  neg  Phalen's Test    neg  Median Nerve Compression Test neg  Ulnar-sided Compression Test neg  LT Ballottment Test   neg  Snuff box tenderness   neg  Vaughn's Test   neg  LT Instability    neg  Hook of Hamate Tenderness  neg     EXTREMITY NEURO-VASCULAR EXAMINATION: Sensation grossly intact to light touch all dermatomal regions. DTR 2+ Biceps, Triceps, BR and Negative Esvin's sign. Grossly intact motor function at Elbow, Wrist and Hand. Distal pulses radial and ulnar 2+, brisk cap refill, symmetric.    Other Findings:      Xrays: (AP, lateral, oblique) Right elbow ordered and reviewed by me personally today: no evidence of fracture or dislocation.  Osseous structures appear intact.        ASSESSMENT:  Right elbow pain, lateral epicondylitis      PLAN:  Michael Twist bar  Stretches instructed by me  NSAIDs  OT with Elaina Moore     I made the decision to obtain old records of the patient including previous notes and imaging. New imaging was ordered today of the extremity or extremities evaluated. I independently reviewed and interpreted the radiographs and/or MRIs today as well as prior imaging.

## 2023-11-06 ENCOUNTER — PATIENT MESSAGE (OUTPATIENT)
Dept: ADMINISTRATIVE | Facility: HOSPITAL | Age: 63
End: 2023-11-06
Payer: MEDICARE

## 2023-11-13 ENCOUNTER — CLINICAL SUPPORT (OUTPATIENT)
Dept: REHABILITATION | Facility: HOSPITAL | Age: 63
End: 2023-11-13
Payer: MEDICARE

## 2023-11-13 DIAGNOSIS — M77.11 RIGHT LATERAL EPICONDYLITIS: ICD-10-CM

## 2023-11-13 DIAGNOSIS — M25.521 PAIN IN RIGHT ELBOW: Primary | ICD-10-CM

## 2023-11-13 DIAGNOSIS — M25.521 RIGHT ELBOW PAIN: ICD-10-CM

## 2023-11-13 PROCEDURE — 97110 THERAPEUTIC EXERCISES: CPT

## 2023-11-13 PROCEDURE — 97165 OT EVAL LOW COMPLEX 30 MIN: CPT

## 2023-11-13 NOTE — PATIENT INSTRUCTIONS
[facility], OCCUPATIONAL THERAPY  HOME EXERCISE PROGRAM      Lateral Epicondylitis Conservative Management    Joint Protection:  Use larger joints and muscles when possible (resting objects in crook of elbow)  Stop activities before the point of discomfort  Avoid activities that put strain on painful or stiff joints  Avoid a tight or prolonged grasp on objects  Avoid any lifting or gripping with elbow in extension  If you must lift, lift with elbows bent at side, object close to you, and hands turned palm up. elbow extension  Balance Rest and Activity:  Take frequent, short breaks to stretch  Avoid staying in one position for a long time  Alternate heavy and light activities  Eliminate unnecessary activities  Reduce the effort:  Avoid excessive loads. Dont be afraid to ask for help. Use carts and tabletops to make tasks easier.  Keep items nearby (such as keyboard)  Helpful Tips: slide objects; use your palms instead of fingers, keeps heavy items close to your body, use larger handles, pens and pencils, look for lightweight options, use wheels or carts to roll objects          [facility]CC[facility]APY  HOME EXERCISE PROGRAM       Complete the following with 1lb weight, 10repetitions. Complete 1set, 1xday

## 2023-11-20 ENCOUNTER — CLINICAL SUPPORT (OUTPATIENT)
Dept: REHABILITATION | Facility: HOSPITAL | Age: 63
End: 2023-11-20
Payer: MEDICARE

## 2023-11-20 DIAGNOSIS — M25.521 PAIN IN RIGHT ELBOW: Primary | ICD-10-CM

## 2023-11-20 PROCEDURE — 97110 THERAPEUTIC EXERCISES: CPT

## 2023-11-20 PROCEDURE — 97140 MANUAL THERAPY 1/> REGIONS: CPT

## 2023-11-20 NOTE — PLAN OF CARE
[facility]  Occupational Therapy Initial Evaluation    Date: 11/13/2023  Name: Fabian Ivy  Clinic Number: [id number]    Therapy Diagnosis:   Encounter Diagnoses   Name Primary?    Right elbow pain     Right lateral epicondylitis     Pain in right elbow Yes     Physician: Gretel Martínez MD    Physician Orders: eval and treat  Medical Diagnosis: Right lateral epicondylitis   Date of Injury/Onset: months  Evaluation Date: 11/13/2023  Insurance Authorization Period Expiration: 10/23/2024  Plan of Care Expiration: 11/13/2023-12/31/2023  Date of Return to MD: PRN- has appt set up with Dr. Townsend in Dec  Visit # / Visits authorized: 1 / 1  FOTO: (date and score)    Precautions:  Standard    Time In: 10am  Time Out: 11am  Total Appointment Time (timed & untimed codes): 60 minutes      SUBJECTIVE     Date of Onset: months    History of Current Condition/Mechanism of Injury: Fabian reports: it is progressively getting better with the stretches.     Falls: none    Involved Side: Right   Dominant Side: Right  Imaging:  see chart  Prior Therapy: none for this injury   Occupation:  Retired      Functional Limitations/Social History:    Previous functional status includes: Independent with all ADLs.     Current Functional Status   Home/Living environment: lives with their partner      Limitation of Functional Status as follows:   ADLs/IADLs:     - Feeding: min difficulty     - Bathing: mod difficulty     - Dressing/Grooming: mod difficulty    - Driving: pain with driving motorcyle      Leisure: construction on property, fishing, riding motorcyle     Pain:  Functional Pain Scale Rating 0-10: Current 7/10, worst 8/10, best 4/10   Location: right elbow  Description: Aching and Sharp  Aggravating Factors: Night Time and Extension  Easing Factors: rest     Patient's Goals for Therapy: to decrease pain    Medical History:   Past Medical History:   Diagnosis Date    Hepatitis C     s/p harvone    Hypertension         Surgical History:    has no past surgical history on file.    Medications:   has a current medication list which includes the following prescription(s): atorvastatin, azithromycin, chlorhexidine, cyclobenzaprine, diclofenac sodium, hydrocodone-acetaminophen, ketoconazole, pfizer covid-19 tomasz vaccn(pf), and valsartan.    Allergies:   Review of patient's allergies indicates:  No Known Allergies       OBJECTIVE     Observation/Appearance: no observable swelling          Elbow and Wrist ROM. Measured in degrees.   11/13/2023 11/13/2023    Left Right   Elbow Ext/Flex 5/145 10/135  5/145 with pain   Supination/Pronation 60/80 60/80   Wrist Ext/Flex 60/60 65/65   Wrist RD/UD 25/30 25/30      Strength (Dynamometer) and Pinch Strength (Pinch Gauge)  Measured in pounds.   11/13/2023 11/13/2023    Left Right   Rung  55 *point of pain   Rung II elbow extended  95 30 *point of pain   Key Pinch 24 21   3pt Pinch 21 19 point of pain     Sensation: some burning in the long finger knuckle      Manual Muscle Test   11/13/2023 11/13/2023    Left Right   Wrist Extension  5/5 5/5   Wrist Flexion 5/5 5/5   Elbow Extension 5/5 4/5   Elbow Flexion 5/5 5/5     Special Tests  Tennis Elbow Test +   Resisted Middle Finger Extension Test +       Limitation/Restriction for FOTO elbow Survey    Therapist reviewed FOTO scores for Fabian Ivy on 11/13/2023.   FOTO documents entered into [employer] - see Media section.    Limitation Score: 31%         Treatment   Total Treatment time (time-based codes) separate from Evaluation: 15 minutes    Fabian received the treatments listed below:     Supervised modalities after being cleared for contradictions: Hot Pack - x 5 min to elbow        Manual therapy techniques:  were applied to the: right elbow for 2 minutes, including:  - lat epi massage instruction    Therapeutic exercises to develop ROM for 8 minutes, including:    ASTYM stretchs 1-3 30 sec holds x 3 repetitions   Nirschl program 1  10  reps each with 1# weight  Wrist flex/ext with elbow flex  Wrist UD/RD with elbow flex  Sup/ pro with elbow flex  EDC with yellow Rubber band  Stress ball gripping palm up. ( Can use tennis ball is bothers IF)         Patient Education and Home Exercises      Education provided:   - HEP  - lat epi precautions/ modifications  -     Written Home Exercises Provided: yes.  Exercises were reviewed and Fabian was able to demonstrate them prior to the end of the session.  Fabian demonstrated good  understanding of the education provided. See EMR under Patient Instructions for exercises provided during therapy sessions.     Pt was advised to perform these exercises free of pain, and to stop performing them if pain occurs.    Patient/Family Education: role of OT, goals for OT, scheduling/cancellations - pt verbalized understanding. Discussed insurance limitations with patient.    ASSESSMENT     Fabian Ivy is a 63 y.o. male referred to outpatient occupational therapy and presents with a medical diagnosis of right lat epicondylitis.  Patient presents with the following therapy deficits: Decreased  strength, Decreased muscle strength, Decreased functional hand use, and Increased pain and demonstrates limitations as described in the chart below. Following medical record review it is determined that pt will benefit from occupational therapy services in order to maximize pain free and/or functional use of right elbow. The following goals were discussed with the patient and patient is in agreement with them as to be addressed in the treatment plan. The patient's rehab potential is Good.     Anticipated barriers to occupational therapy: need to continue some work  Pt has no cultural, educational or language barriers to learning provided.    Profile and History Assessment of Occupational Performance Level of Clinical Decision Making Complexity Score   Occupational Profile:   Fabian Ivy is a 63 y.o. male who lives with their partner  and is retired Fabian Ivy has difficulty with  ADLs and IADLs as listed previously, which  Affecting [facility] functional abilities.      Comorbidities:    has a past medical history of Hepatitis C and Hypertension.    Medical and Therapy History Review:   Brief               Performance Deficits    Physical:  Muscle Power/Strength  Muscle Endurance   Strength  Gross Motor Coordination  Pain    Cognitive:  No Deficits    Psychosocial:    No Deficits     Clinical Decision Making:  low    Assessment Process:  Problem-Focused Assessments    Modification/Need for Assistance:  Not Necessary    Intervention Selection:  Limited Treatment Options       low  Based on PMHX, co morbidities , data from assessments and functional level of assistance required with task and clinical presentation directly impacting function.         Goals:   The following goals were discussed with the patient and patient is in agreement with them as to be addressed in the treatment plan.     Long Term Goals (LTGs); to be met by discharge.  LTG #1: Pt will report a pain level of 2 out of 10 with ADLs   LTG #2: Pt will demo improved FOTO score by 20 points.   LTG #3: Pt will return to prior level of function for ADLs and household management.     Short Term Goals (STGs); to be met within 4 weeks (12/15/2023).  STG #1: Pt will report 4 out of 10 pain level with ADLs.  STG #2: Pt will report/demo Almond with riding motorcyle.  STG #3: Pt will report/demo Almond with bathing .  STG #4: Pt will demonstrate independence with issued HEP.   STG #5: Pt will demo improved  strength by 5# in order to increase ability to perform simple housework tasks.       PLAN   Plan of Care Certification: 11/13/2023 to 12/31/2023.     Outpatient Occupational Therapy 2 times weekly for 6 weeks to include the following interventions: Modalities for pain management, US 3 mhz, Therapeutic exercises/activities., Iontophoresis with 2.0 cc Dexamethasone,  Strengthening, and Joint Protection.      Elaina Moore, OTR/L,CHT      I CERTIFY THE NEED FOR THESE SERVICES FURNISHED UNDER THIS PLAN OF TREATMENT AND WHILE UNDER MY CARE  Physician's comments:      Physician's Signature: ___________________________________________________

## 2023-11-20 NOTE — PROGRESS NOTES
[facility]  Occupational Therapy Treatment Note    Date: 11/20/2023  Name: Fabian Ivy  Clinic Number: [id number]    Therapy Diagnosis:   Encounter Diagnosis   Name Primary?    Pain in right elbow Yes     Physician: Gretel Martínez MD    Physician Orders: eval and treat  Medical Diagnosis: Right lateral epicondylitis   Date of Injury/Onset: months  Evaluation Date: 11/13/2023  Insurance Authorization Period Expiration: 10/23/2024  Plan of Care Expiration: 11/13/2023-12/31/2023  Date of Return to MD: PRN- has appt set up with Dr. Townsend in Dec  Visit # / Visits authorized: 1 / 20  FOTO: eval 1/3    Precautions:  Standard    Time In: 1005am  Time Out: 1045am  Total Billable Time: 40 minutes      SUBJECTIVE     Pt reports: It's doing better, I'm in less night pain There are times I don't notice it.   He was compliant with home exercise program given last session.   Response to previous treatment:less pain  Functional change: able to do some more with it - able to put seatbelt on    Pain: 5/10  Location: right elbow    OBJECTIVE   Objective Measures updated at progress report unless specified.    Elbow and Wrist ROM. Measured in degrees.    11/13/2023 11/13/2023     Left Right   Elbow Ext/Flex 5/145 10/135  5/145 with pain   Supination/Pronation 60/80 60/80   Wrist Ext/Flex 60/60 65/65   Wrist RD/UD 25/30 25/30       Strength (Dynamometer) and Pinch Strength (Pinch Gauge)  Measured in pounds.    11/13/2023 11/13/2023     Left Right   Rung  55 *point of pain   Rung II elbow extended  95 30 *point of pain   Key Pinch 24 21   3pt Pinch 21 19 point of pain      Sensation: some burning in the long finger knuckle        Manual Muscle Test    11/13/2023 11/13/2023     Left Right   Wrist Extension  5/5 5/5   Wrist Flexion 5/5 5/5   Elbow Extension 5/5 4/5   Elbow Flexion 5/5 5/5           Treatment     Fabian received the treatments listed below:       Supervised modalities after being  cleared for contradictions: Hot Pack - x 8 min to elbow        Manual therapy techniques:  were applied to the: right elbow for minutes, including:  - iatym with Hawksgrip tool      Therapeutic exercises to develop ROM for  29 minutes, including:     ASTYM stretchs 1-3 30 sec holds x 3 repetitions   Nirschl program 1  10 reps each with 2# weight  Wrist flex/ext with elbow flex  Wrist UD/RD with elbow flex  Sup/ pro with elbow flex  EDC with yellow Rubber band  Stress ball gripping palm up. ( Can use tennis ball is bothers IF)    Red flex bar  X 10 reps   Red theraband  X 10 reps supinated elbow extension            Patient Education and Home Exercises      Education provided:   - Cont HEP  - Progress towards goals     Written Home Exercises Provided: Patient instructed to cont prior HEP.  Exercises were reviewed and Fabian was able to demonstrate them prior to the end of the session.  Fabian demonstrated good  understanding of the HEP provided. See EMR under Patient Instructions for exercises provided during therapy sessions.      ASSESSMENT      Pt. Presents for first visit after initial evaluation. He reports decrease in pain overall and compliance with HEP. Added use of red flex bar and red theraband for strengthening this date. Pt. Reports no increase in pain following session. Will continue to progress.     Fabian is progressing  towards his goals and there are no updates to goals at this time. Pt prognosis is Good.     Pt will continue to benefit from skilled outpatient occupational therapy to address the deficits listed in the problem list on initial evaluation, provide pt/family education and to maximize pt's level of independence in the home and community environment.     Pt's spiritual, cultural and educational needs considered and pt agreeable to plan of care and goals.    Anticipated barriers to occupational therapy: has to do some work    Goals:   The following goals were discussed with the patient and  patient is in agreement with them as to be addressed in the treatment plan.      Long Term Goals (LTGs); to be met by discharge.  LTG #1: Pt will report a pain level of 2 out of 10 with ADLs-progressing  LTG #2: Pt will demo improved FOTO score by 20 points. progressing  LTG #3: Pt will return to prior level of function for ADLs and household management. progressing     Short Term Goals (STGs); to be met within 4 weeks (12/15/2023).  STG #1: Pt will report 4 out of 10 pain level with ADLs.progressing  STG #2: Pt will report/demo [place] with riding motorcyle.progressing  STG #3: Pt will report/demo [place] with bathing .progressing  STG #4: Pt will demonstrate independence with issued HEP. progressing  STG #5: Pt will demo improved  strength by 5# in order to increase ability to perform simple housework tasks. progressing    PLAN     Continue skilled occupational therapy with individualized plan of care focusing on decreasing pain and increasing function    Updates/Grading for next session: cont per tolerance    Elaina Moore, OTR/L,CHT

## 2023-12-06 ENCOUNTER — CLINICAL SUPPORT (OUTPATIENT)
Dept: REHABILITATION | Facility: HOSPITAL | Age: 63
End: 2023-12-06
Payer: MEDICARE

## 2023-12-06 DIAGNOSIS — M25.521 PAIN IN RIGHT ELBOW: Primary | ICD-10-CM

## 2023-12-06 PROCEDURE — 97112 NEUROMUSCULAR REEDUCATION: CPT

## 2023-12-06 PROCEDURE — 97110 THERAPEUTIC EXERCISES: CPT

## 2023-12-06 NOTE — PROGRESS NOTES
[facility]  Occupational Therapy Treatment Note    Date: 12/6/2023  Name: Fabian Ivy  Clinic Number: [id number]    Therapy Diagnosis:   Encounter Diagnosis   Name Primary?    Pain in right elbow Yes     Physician: Gretel Martínez MD    Physician Orders: eval and treat  Medical Diagnosis: Right lateral epicondylitis   Date of Injury/Onset: months  Evaluation Date: 11/13/2023  Insurance Authorization Period Expiration: 10/23/2024  Plan of Care Expiration: 11/13/2023-12/31/2023  Date of Return to MD: PRN- has appt set up with Dr. Townsend in Dec  Visit # / Visits authorized: 3/ 20  FOTO: eval 2/3    Precautions:  Standard    Time In: 1005am  Time Out: 11 am  Total Billable Time: 55 minutes      SUBJECTIVE     Pt reports: I feel 75%   He was compliant with home exercise program given last session.   Response to previous treatment:less pain  Functional change: able to do some more with it - able to put seatbelt on    Pain: 5/10  Location: right elbow    OBJECTIVE   Objective Measures updated at progress report unless specified.    Elbow and Wrist ROM. Measured in degrees.    11/13/2023 11/13/2023 12/6/2023     Left Right right   Elbow Ext/Flex 5/145 10/135  5/145 with pain 5/145 *slight pain   Supination/Pronation 60/80 60/80    Wrist Ext/Flex 60/60 65/65    Wrist RD/UD 25/30 25/30        Strength (Dynamometer) and Pinch Strength (Pinch Gauge)  Measured in pounds.    11/13/2023 11/13/2023 12/6/2023     Left Right Right   Rung  55 *point of pain 80 *point of pain   Rung II elbow extended  95 30 *point of pain 40* point of pain  (Post manual 80 no pain)   Cesar Pinch 24 21 NT   3pt Pinch 21 19 point of pain NT      Sensation: some burning in the long finger knuckle        Manual Muscle Test    11/13/2023 11/13/2023     Left Right   Wrist Extension  5/5 5/5   Wrist Flexion 5/5 5/5   Elbow Extension 5/5 4/5   Elbow Flexion 5/5 5/5           Treatment     Fabian received the treatments listed  below:               NMR :  were applied to the: right elbow for 15 minutes, including:  - radial head approximation   - GH approximation   - supination contract relax   - KT To pect minor anchor at origin, pulling down to insertion  -KT across first webspace  - KT lateral elbow pulling to relax supinator         Therapeutic exercises to develop ROM for  40 minutes, including:     ASTYM stretchs 1-3 30 sec holds x 3 repetitions   Pect stretch in doorway X 30 sec holds x 3   UBE  X 3 min forward, 3 min backward level 4   Elbow flexion 3 ways X 5# x 20 each way   Nirschl program 1  10 reps each with 2# weight  Wrist flex/ext with elbow flex  Wrist UD/RD with elbow flex  Sup/ pro with elbow flex  EDC with yellow Rubber band  Stress ball gripping palm up. ( Can use tennis ball is bothers IF)    Red flex bar  X 10 reps   Red theraband  X 10 reps supinated elbow extension  X 20 reps rows with tactile assist for shoulder position             Patient Education and Home Exercises      Education provided:   - Cont HEP  - Progress towards goals     Written Home Exercises Provided: Patient instructed to cont prior HEP.  Exercises were reviewed and Fabian was able to demonstrate them prior to the end of the session.  Fabian demonstrated good  understanding of the HEP provided. See EMR under Patient Instructions for exercises provided during therapy sessions.      ASSESSMENT    He reports decrease in pain overall and compliance with HEP.  Pt. Reports no increase in pain following session. Post manual and corrections with KT pt reports no pain and can reach across back of neck without pain. Pt. With 22 point improvement in FOTO score this date. Added pect stretches and scapular strengthening along with postural education. Will continue to progress.     Fabian is progressing  towards his goals and there are no updates to goals at this time. Pt prognosis is Good.     Pt will continue to benefit from skilled outpatient occupational therapy  to address the deficits listed in the problem list on initial evaluation, provide pt/family education and to maximize pt's level of independence in the home and community environment.     Pt's spiritual, cultural and educational needs considered and pt agreeable to plan of care and goals.    Anticipated barriers to occupational therapy: has to do some work    Goals:   The following goals were discussed with the patient and patient is in agreement with them as to be addressed in the treatment plan.      Long Term Goals (LTGs); to be met by discharge.  LTG #1: Pt will report a pain level of 2 out of 10 with ADLs-progressing  LTG #2: Pt will demo improved FOTO score by 20 points. progressing  LTG #3: Pt will return to prior level of function for ADLs and household management. progressing     Short Term Goals (STGs); to be met within 4 weeks (12/15/2023).  STG #1: Pt will report 4 out of 10 pain level with ADLs.progressing  STG #2: Pt will report/demo [place] with riding motorcyle.progressing  STG #3: Pt will report/demo [place] with bathing .progressing  STG #4: Pt will demonstrate independence with issued HEP. progressing  STG #5: Pt will demo improved  strength by 5# in order to increase ability to perform simple housework tasks. MET 12/6/2023    PLAN     Continue skilled occupational therapy with individualized plan of care focusing on decreasing pain and increasing function    Updates/Grading for next session: cont per tolerance    Elaina Moore, OTR/L,CHT

## 2023-12-15 ENCOUNTER — CLINICAL SUPPORT (OUTPATIENT)
Dept: REHABILITATION | Facility: HOSPITAL | Age: 63
End: 2023-12-15
Payer: MEDICARE

## 2023-12-15 DIAGNOSIS — M25.521 PAIN IN RIGHT ELBOW: Primary | ICD-10-CM

## 2023-12-15 PROCEDURE — 97112 NEUROMUSCULAR REEDUCATION: CPT

## 2023-12-15 PROCEDURE — 97110 THERAPEUTIC EXERCISES: CPT

## 2023-12-15 NOTE — PROGRESS NOTES
[facility]  Occupational Therapy Treatment Note    Date: 12/15/2023  Name: Fabian Ivy  Clinic Number: [id number]    Therapy Diagnosis:   Encounter Diagnosis   Name Primary?    Pain in right elbow Yes     Physician: Gretel Martínez MD    Physician Orders: eval and treat  Medical Diagnosis: Right lateral epicondylitis   Date of Injury/Onset: months  Evaluation Date: 11/13/2023  Insurance Authorization Period Expiration: 10/23/2024  Plan of Care Expiration: 11/13/2023-12/31/2023  Date of Return to MD: PRN- has appt set up with Dr. Townsend in Dec  Visit # / Visits authorized: 3/ 20  FOTO: eval 2/3    Precautions:  Standard    Time In: 1000am  Time Out: 1055 am  Total Billable Time: 55 minutes      SUBJECTIVE     Pt reports: I can do a lot of things, but I still feel the dull pain. I don't have pain if I don't think about it .   He was compliant with home exercise program given last session.   Response to previous treatment:less pain  Functional change: able to do some more with it - able to put on necklace without pain    Pain: 3-4/10  Location: right elbow    OBJECTIVE   Objective Measures updated at progress report unless specified.    Elbow and Wrist ROM. Measured in degrees.    11/13/2023 11/13/2023 12/6/2023     Left Right right   Elbow Ext/Flex 5/145 10/135  5/145 with pain 5/145 *slight pain   Supination/Pronation 60/80 60/80    Wrist Ext/Flex 60/60 65/65    Wrist RD/UD 25/30 25/30        Strength (Dynamometer) and Pinch Strength (Pinch Gauge)  Measured in pounds.    11/13/2023 11/13/2023 12/6/2023 12/15/2023     Left Right Right Right   Rung  55 *point of pain 80 *point of pain 90 +10 no pain   Rung II elbow extended  95 30 *point of pain 40* point of pain  (Post manual 80 no pain) 60 * point of pain   80 max   Key Pinch 24 21 NT 21   3pt Pinch 21 19 point of pain NT 20      Sensation: less burning in the long finger knuckle        Manual Muscle Test    11/13/2023  11/13/2023     Left Right   Wrist Extension  5/5 5/5   Wrist Flexion 5/5 5/5   Elbow Extension 5/5 4/5   Elbow Flexion 5/5 5/5           Treatment     Fabian received the treatments listed below:               NMR :  were applied to the: right elbow for 15 minutes, including:  - radial head approximation   - GH approximation   - supination contract relax   -   -KT across first webspace  - KT LF tip         Therapeutic exercises to develop ROM for  40 minutes, including:     UBE  X 3 min forward, 3 min backward level 4   Elbow flexion 3 ways X 5# x 20 each way   Nirschl program 1  20 reps each with 3# weight  Wrist flex/ext with elbow flex  Wrist UD/RD with elbow flex  Sup/ pro with elbow flex  EDC with yellow Rubber band  Stress ball gripping palm up. ( Can use tennis ball is bothers IF)    Nirschl Program 2 1# x 20 reps each elbow extended   Red flex bar  X 20 reps maya twist       Red theraband NT X 10 reps supinated elbow extension  X 20 reps rows with tactile assist for shoulder position             Patient Education and Home Exercises      Education provided:   - Cont HEP  - Progress towards goals     Written Home Exercises Provided: Patient instructed to cont prior HEP.  Exercises were reviewed and Fabian was able to demonstrate them prior to the end of the session.  Fabian demonstrated good  understanding of the HEP provided. See EMR under Patient Instructions for exercises provided during therapy sessions.      ASSESSMENT    He reports decrease in pain overall and compliance with HEP.  No pain with UBE this date at same level (pain last session) Pt. Reports no increase in pain following session. Post manual and corrections with KT pt reports no pain and can reach across back of neck without pain. Pt. Reports doing taping at home as well. He is motivated. Able to complete nirschl 2 program with 1 #weight today with good participation.  Will continue to progress.     Fabian is progressing  towards his goals and there  are no updates to goals at this time. Pt prognosis is Good.     Pt will continue to benefit from skilled outpatient occupational therapy to address the deficits listed in the problem list on initial evaluation, provide pt/family education and to maximize pt's level of independence in the home and community environment.     Pt's spiritual, cultural and educational needs considered and pt agreeable to plan of care and goals.    Anticipated barriers to occupational therapy: has to do some work    Goals:   The following goals were discussed with the patient and patient is in agreement with them as to be addressed in the treatment plan.      Long Term Goals (LTGs); to be met by discharge.  LTG #1: Pt will report a pain level of 2 out of 10 with ADLs-progressing  LTG #2: Pt will demo improved FOTO score by 20 points. progressing  LTG #3: Pt will return to prior level of function for ADLs and household management. progressing     Short Term Goals (STGs); to be met within 4 weeks (12/15/2023).  STG #1: Pt will report 4 out of 10 pain level with ADLs.progressing  STG #2: Pt will report/demo [place] with riding motorcyle.progressing  STG #3: Pt will report/demo [place] with bathing .progressing  STG #4: Pt will demonstrate independence with issued HEP. progressing  STG #5: Pt will demo improved  strength by 5# in order to increase ability to perform simple housework tasks. MET 12/6/2023    PLAN     Continue skilled occupational therapy with individualized plan of care focusing on decreasing pain and increasing function    Updates/Grading for next session: cont per tolerance    Elaina Moore, OTR/L,CHT

## 2023-12-21 ENCOUNTER — OFFICE VISIT (OUTPATIENT)
Dept: ORTHOPEDICS | Facility: CLINIC | Age: 63
End: 2023-12-21
Payer: MEDICARE

## 2023-12-21 VITALS — HEIGHT: 71 IN | BODY MASS INDEX: 31.98 KG/M2

## 2023-12-21 DIAGNOSIS — M77.11 LATERAL EPICONDYLITIS OF RIGHT ELBOW: Primary | ICD-10-CM

## 2023-12-21 PROCEDURE — 99999 PR PBB SHADOW E&M-EST. PATIENT-LVL III: ICD-10-PCS | Mod: PBBFAC,,, | Performed by: ORTHOPAEDIC SURGERY

## 2023-12-21 PROCEDURE — 99999 PR PBB SHADOW E&M-EST. PATIENT-LVL III: CPT | Mod: PBBFAC,,, | Performed by: ORTHOPAEDIC SURGERY

## 2023-12-21 PROCEDURE — 99213 OFFICE O/P EST LOW 20 MIN: CPT | Mod: PBBFAC,PN | Performed by: ORTHOPAEDIC SURGERY

## 2023-12-21 PROCEDURE — 99203 PR OFFICE/OUTPT VISIT, NEW, LEVL III, 30-44 MIN: ICD-10-PCS | Mod: S$PBB,,, | Performed by: ORTHOPAEDIC SURGERY

## 2023-12-21 PROCEDURE — 99203 OFFICE O/P NEW LOW 30 MIN: CPT | Mod: S$PBB,,, | Performed by: ORTHOPAEDIC SURGERY

## 2023-12-21 RX ORDER — MELOXICAM 15 MG/1
15 TABLET ORAL DAILY
Qty: 30 TABLET | Refills: 1 | Status: SHIPPED | OUTPATIENT
Start: 2023-12-21 | End: 2024-02-14

## 2023-12-21 RX ORDER — DICLOFENAC SODIUM 10 MG/G
2 GEL TOPICAL 3 TIMES DAILY
Qty: 100 G | Refills: 1 | Status: SHIPPED | OUTPATIENT
Start: 2023-12-21

## 2023-12-21 NOTE — PROGRESS NOTES
Subjective:      Patient ID: Fabian Ivy is a 63 y.o. male.    Chief Complaint: Pain of the Right Hand and Consult (Right elbow pain)      HPI  Fabian Ivy is a  63 y.o. male presenting today for right elbow and hand symptoms.  There was not a history of trauma.  Onset of symptoms began several months ago   Initially he was having pain in the elbow but now he is having some pain in the hand as well   He did start physical therapy recently at [facility] and symptoms have improved significantly since then   No numbness or tingling is reported  Regarding the right hand he has been diagnosed with arthritis and has some pain in the 2nd and 3rd MP joints.      Review of patient's allergies indicates:  No Known Allergies      Current Outpatient Medications   Medication Sig Dispense Refill    atorvastatin (LIPITOR) 40 MG tablet Take 1 tablet (40 mg total) by mouth once daily. 90 tablet 3    chlorhexidine (PERIDEX) 0.12 % solution SMARTSIG:By Mouth      cyclobenzaprine (FLEXERIL) 10 MG tablet Take 10 mg by mouth 3 (three) times daily as needed.      diclofenac sodium (VOLTAREN) 1 % Gel Apply 2 g topically 2 (two) times daily. 450 g 2    HYDROcodone-acetaminophen (NORCO)  mg per tablet Take 1 tablet by mouth every 12 (twelve) hours as needed.      ketoconazole (NIZORAL) 2 % cream Apply topically 2 (two) times daily. Use for minimum of 2 weeks 15 g 0    PFIZER COVID-19 BUCK VACCN,PF, 30 mcg/0.3 mL injection       valsartan (DIOVAN) 40 MG tablet Take 1 tablet (40 mg total) by mouth once daily. 90 tablet 3    azithromycin (Z-ANALI) 250 MG tablet Take 250 mg by mouth.      diclofenac sodium (VOLTAREN) 1 % Gel Apply 2 g topically 3 (three) times daily. 100 g 1    meloxicam (MOBIC) 15 MG tablet Take 1 tablet (15 mg total) by mouth once daily. 30 tablet 1     No current facility-administered medications for this visit.       Past Medical History:   Diagnosis Date    Hepatitis C     s/p harvone    Hypertension        No past  "surgical history on file.    Review of Systems:  ROS    OBJECTIVE:     PHYSICAL EXAM:  Height: 5' 11" (180.3 cm)    Vitals:    12/21/23 1526 12/21/23 1527   Height: 5' 11" (1.803 m)    PainSc:   4   3   PainLoc: Elbow Hand     Well developed, well nourished male in no acute distress  Alert and oriented x 3  HEENT- Normal exam  Lungs- Clear to auscultation  Heart- Regular rate and rhythm  Abdomen- Soft nontender  Extremity exam- examination right elbow there is some mild tenderness laterally slight swelling   Full range of motion elbow with minimal pain   No instability Tinel sign negative   Examination right hand there is some mild tenderness over the 2nd and 3rd MP joints dorsally and there is slightly decreased flexion at the MP joint as well causing some pain    strength slightly decreased sensation intact  Tinel sign negative    RADIOGRAPHS:  None  Comments: I have personally reviewed the imaging and I agree with the above radiologist's report.    ASSESSMENT/PLAN:     IMPRESSION:  1. Lateral epicondylitis right elbow.      2. Right hand MP arthritis    PLAN:  I explained the nature of these 2 problems the patient   He is currently in therapy so I would recommended that he continue therapy for both the elbow and the hand   I have started him on Mobic 15 mg once a day with food and recommended Voltaren gel topical for use on both the elbow and the hand   I have given him a squeeze ball for gentle strengthening   Follow-up in 4-6 weeks       - We talked at length about the anatomy and pathophysiology of   Encounter Diagnosis   Name Primary?    Lateral epicondylitis of right elbow Yes           Disclaimer: This note has been generated using voice-recognition software. There may be typographical errors that have been missed during proof-reading.     "

## 2023-12-29 ENCOUNTER — CLINICAL SUPPORT (OUTPATIENT)
Dept: REHABILITATION | Facility: HOSPITAL | Age: 63
End: 2023-12-29
Payer: MEDICARE

## 2023-12-29 DIAGNOSIS — M25.521 PAIN IN RIGHT ELBOW: Primary | ICD-10-CM

## 2023-12-29 PROCEDURE — 97110 THERAPEUTIC EXERCISES: CPT

## 2023-12-29 NOTE — PROGRESS NOTES
[facility]  Occupational Therapy Treatment Note    Date: 12/29/2023  Name: Fabian Ivy  Clinic Number: [id number]    Therapy Diagnosis:   Encounter Diagnosis   Name Primary?    Pain in right elbow Yes     Physician: Gretel Martínez MD    Physician Orders: eval and treat  Medical Diagnosis: Right lateral epicondylitis   Date of Injury/Onset: months  Evaluation Date: 11/13/2023  Insurance Authorization Period Expiration: 10/23/2024  Plan of Care Expiration: 11/13/2023-12/31/2023  Date of Return to MD: PRN- has appt set up with Dr. Townsend in Dec  Visit # / Visits authorized: 4/ 20  FOTO: eval 2/3    Precautions:  Standard    Time In: 1000am  Time Out: 1055 am  Total Billable Time: 55 minutes      SUBJECTIVE     Pt reports: I feel about 80% .  He was compliant with home exercise program given last session.   Response to previous treatment:less pain  Functional change: able to do some more with it - able to put on necklace without pain    Pain: 3-4/10  Location: right elbow    OBJECTIVE   Objective Measures updated at progress report unless specified.    Elbow and Wrist ROM. Measured in degrees.    11/13/2023 11/13/2023 12/6/2023     Left Right right   Elbow Ext/Flex 5/145 10/135  5/145 with pain 5/145 *slight pain   Supination/Pronation 60/80 60/80    Wrist Ext/Flex 60/60 65/65    Wrist RD/UD 25/30 25/30        Strength (Dynamometer) and Pinch Strength (Pinch Gauge)  Measured in pounds.    11/13/2023 11/13/2023 12/6/2023 12/15/2023     Left Right Right Right   Rung  55 *point of pain 80 *point of pain 90 +10 no pain   Rung II elbow extended  95 30 *point of pain 40* point of pain  (Post manual 80 no pain) 60 * point of pain   80 max   Key Pinch 24 21 NT 21   3pt Pinch 21 19 point of pain NT 20      Sensation: less burning in the long finger knuckle        Manual Muscle Test    11/13/2023 11/13/2023     Left Right   Wrist Extension  5/5 5/5   Wrist Flexion 5/5 5/5   Elbow Extension  5/5 4/5   Elbow Flexion 5/5 5/5           Treatment     Fabian received the treatments listed below:     Modalities: moist heat pack x 10 min to elbow          NMR :  were applied to the: right elbow for 15 minutes, including: NT  - radial head approximation   - GH approximation   - supination contract relax   -   -KT across first webspace  - KT LF tip         Therapeutic exercises to develop ROM for  45 minutes, including:     UBE  X 3 min forward, 3 min backward level 4   Elbow flexion 3 ways X 5# x 20 each way   Nirschl program 1  20 reps each with 3# weight  Wrist flex/ext with elbow flex  Wrist UD/RD with elbow flex  Sup/ pro with elbow flex  EDC with yellow Rubber band  Stress ball gripping palm up. ( Can use tennis ball is bothers IF)    Nirschl Program 2 2# x 20 reps each elbow extended   Red flex bar  X 20 reps maya twist       Red theraband NT X 10 reps supinated elbow extension  X 20 reps rows with tactile assist for shoulder position             Patient Education and Home Exercises      Education provided:   - Cont HEP  - Progress towards goals     Written Home Exercises Provided: Patient instructed to cont prior HEP.  Exercises were reviewed and Fabian was able to demonstrate them prior to the end of the session.  Fabian demonstrated good  understanding of the HEP provided. See EMR under Patient Instructions for exercises provided during therapy sessions.      ASSESSMENT    He reports decrease in pain overall and compliance with HEP.  No pain with UBE this date at same level (pain last session) Pt. Reports no increase in pain following session. Pt. Reports doing taping at home as well. He is motivated. Able to complete nirschl 2 program with 1 #weight today with good participation.  Will continue to progress.     Fabian is progressing  towards his goals and there are no updates to goals at this time. Pt prognosis is Good.     Pt will continue to benefit from skilled outpatient occupational therapy to address  the deficits listed in the problem list on initial evaluation, provide pt/family education and to maximize pt's level of independence in the home and community environment.     Pt's spiritual, cultural and educational needs considered and pt agreeable to plan of care and goals.    Anticipated barriers to occupational therapy: has to do some work    Goals:   The following goals were discussed with the patient and patient is in agreement with them as to be addressed in the treatment plan.      Long Term Goals (LTGs); to be met by discharge.  LTG #1: Pt will report a pain level of 2 out of 10 with ADLs-progressing  LTG #2: Pt will demo improved FOTO score by 20 points. progressing  LTG #3: Pt will return to prior level of function for ADLs and household management. progressing     Short Term Goals (STGs); to be met within 4 weeks (12/15/2023).  STG #1: Pt will report 4 out of 10 pain level with ADLs.progressing  STG #2: Pt will report/demo [place] with riding motorcyle.progressing  STG #3: Pt will report/demo [place] with bathing .progressing  STG #4: Pt will demonstrate independence with issued HEP. progressing  STG #5: Pt will demo improved  strength by 5# in order to increase ability to perform simple housework tasks. MET 12/6/2023    PLAN     Continue skilled occupational therapy with individualized plan of care focusing on decreasing pain and increasing function    Updates/Grading for next session: cont per tolerance    Elaina Moore, OTR/L,CHT

## 2024-01-05 ENCOUNTER — CLINICAL SUPPORT (OUTPATIENT)
Dept: REHABILITATION | Facility: HOSPITAL | Age: 64
End: 2024-01-05
Payer: MEDICARE

## 2024-01-05 DIAGNOSIS — M25.521 PAIN IN RIGHT ELBOW: Primary | ICD-10-CM

## 2024-01-05 PROCEDURE — 97110 THERAPEUTIC EXERCISES: CPT

## 2024-01-05 NOTE — PROGRESS NOTES
[facility]  Occupational Therapy Treatment Note    Date: 1/5/2024  Name: Fabian Ivy  Clinic Number: [id number]    Therapy Diagnosis:   Encounter Diagnosis   Name Primary?    Pain in right elbow Yes     Physician: Gretel Martínez MD    Physician Orders: eval and treat  Medical Diagnosis: Right lateral epicondylitis   Date of Injury/Onset: months  Evaluation Date: 11/13/2023  Insurance Authorization Period Expiration: 10/23/2024  Plan of Care Expiration: 11/13/2023-12/31/2023  Date of Return to MD: PRN- has appt set up with Dr. Townsend in Dec  Visit # / Visits authorized: 6/ 20  FOTO: eval 2/3    Precautions:  Standard    Time In: 1000am  Time Out: 1040 am  Total Billable Time: 40 minutes      SUBJECTIVE     Pt reports: It still looks a little swollen- pt reports he needs to leave a little early today  He was compliant with home exercise program given last session.   Response to previous treatment:less pain  Functional change: able to do some more with it - able to put on necklace without pain    Pain: 3-4/10  Location: right elbow    OBJECTIVE   Objective Measures updated at progress report unless specified.    Elbow and Wrist ROM. Measured in degrees.    11/13/2023 11/13/2023 12/6/2023     Left Right right   Elbow Ext/Flex 5/145 10/135  5/145 with pain 5/145 *slight pain   Supination/Pronation 60/80 60/80    Wrist Ext/Flex 60/60 65/65    Wrist RD/UD 25/30 25/30        Strength (Dynamometer) and Pinch Strength (Pinch Gauge)  Measured in pounds.    11/13/2023 11/13/2023 12/6/2023 12/15/2023     Left Right Right Right   Rung  55 *point of pain 80 *point of pain 90 +10 no pain   Rung II elbow extended  95 30 *point of pain 40* point of pain  (Post manual 80 no pain) 60 * point of pain   80 max   Key Pinch 24 21 NT 21   3pt Pinch 21 19 point of pain NT 20      Sensation: less burning in the long finger knuckle        Manual Muscle Test    11/13/2023 11/13/2023     Left Right    Wrist Extension  5/5 5/5   Wrist Flexion 5/5 5/5   Elbow Extension 5/5 4/5   Elbow Flexion 5/5 5/5           Treatment     Fabian received the treatments listed below:     Modalities: moist heat pack x 10 min to elbow          NMR :  were applied to the: right elbow for 15 minutes, including: NT  - radial head approximation   - GH approximation   - supination contract relax   -   -KT across first webspace  - KT LF tip         Therapeutic exercises to develop ROM for  30 minutes, including:     UBE  X 3 min forward, 3 min backward level 4   Elbow flexion 3 ways X 5# x 20 each way   Nirschl program 1  20 reps each with 3# weight  Wrist flex/ext with elbow flex  Wrist UD/RD with elbow flex  Sup/ pro with elbow flex  EDC with yellow Rubber band  Stress ball gripping palm up. ( Can use tennis ball is bothers IF)    Nirschl Program 2 2# x 20 reps each elbow extended   Red flex bar  X 20 reps maya twist       Red theraband NT X 10 reps supinated elbow extension  X 20 reps rows with tactile assist for shoulder position             Patient Education and Home Exercises      Education provided:   - Cont HEP  - Progress towards goals     Written Home Exercises Provided: Patient instructed to cont prior HEP.  Exercises were reviewed and Fabian was able to demonstrate them prior to the end of the session.  Fabian demonstrated good  understanding of the HEP provided. See EMR under Patient Instructions for exercises provided during therapy sessions.      ASSESSMENT    He reports decrease in pain overall and compliance with HEP.  No pain with UBE this date at same level (pain last session) Pt. Reports no increase in pain following session. Pt. Reports doing taping at home as well. He is motivated. Able to complete nirschl 2 program with 1 #weight today with good participation.  Will continue to progress.     Fabian is progressing  towards his goals and there are no updates to goals at this time. Pt prognosis is Good.     Pt will continue  to benefit from skilled outpatient occupational therapy to address the deficits listed in the problem list on initial evaluation, provide pt/family education and to maximize pt's level of independence in the home and community environment.     Pt's spiritual, cultural and educational needs considered and pt agreeable to plan of care and goals.    Anticipated barriers to occupational therapy: has to do some work    Goals:   The following goals were discussed with the patient and patient is in agreement with them as to be addressed in the treatment plan.      Long Term Goals (LTGs); to be met by discharge.  LTG #1: Pt will report a pain level of 2 out of 10 with ADLs-progressing  LTG #2: Pt will demo improved FOTO score by 20 points. progressing  LTG #3: Pt will return to prior level of function for ADLs and household management. progressing     Short Term Goals (STGs); to be met within 4 weeks (12/15/2023).  STG #1: Pt will report 4 out of 10 pain level with ADLs.progressing  STG #2: Pt will report/demo [place] with riding motorcyle.progressing  STG #3: Pt will report/demo [place] with bathing .progressing  STG #4: Pt will demonstrate independence with issued HEP. progressing  STG #5: Pt will demo improved  strength by 5# in order to increase ability to perform simple housework tasks. MET 12/6/2023    PLAN     Continue skilled occupational therapy with individualized plan of care focusing on decreasing pain and increasing function    Updates/Grading for next session: cont per tolerance    Elaina Moore, OTR/L,CHT

## 2024-01-12 ENCOUNTER — CLINICAL SUPPORT (OUTPATIENT)
Dept: REHABILITATION | Facility: HOSPITAL | Age: 64
End: 2024-01-12
Payer: MEDICARE

## 2024-01-12 DIAGNOSIS — M25.521 PAIN IN RIGHT ELBOW: Primary | ICD-10-CM

## 2024-01-12 PROCEDURE — 97110 THERAPEUTIC EXERCISES: CPT

## 2024-01-12 NOTE — PROGRESS NOTES
[facility]  Occupational Therapy Treatment Note    Date: 1/12/2024  Name: Fabian Ivy  Clinic Number: [id number]    Therapy Diagnosis:   Encounter Diagnosis   Name Primary?    Pain in right elbow Yes     Physician: Gretel Martínez MD    Physician Orders: eval and treat  Medical Diagnosis: Right lateral epicondylitis   Date of Injury/Onset: months  Evaluation Date: 11/13/2023  Insurance Authorization Period Expiration: 10/23/2024  Plan of Care Expiration: 11/13/2023-12/31/2023  Date of Return to MD: PRN  Visit # / Visits authorized: 7/ 20  FOTO: eval 2/3    Precautions:  Standard    Time In: 1000am  Time Out: 1055 am  Total Billable Time: 55 minutes      SUBJECTIVE     Pt reports: It's feeling pretty good, still has trouble with full end range flexion  He was compliant with home exercise program given last session.   Response to previous treatment:less pain  Functional change: able to do some more with it - able to put on necklace without pain    Pain: 3-4/10  Location: right elbow    OBJECTIVE   Objective Measures updated at progress report unless specified.    Elbow and Wrist ROM. Measured in degrees.    11/13/2023 11/13/2023 12/6/2023     Left Right right   Elbow Ext/Flex 5/145 10/135  5/145 with pain 5/145 *slight pain   Supination/Pronation 60/80 60/80    Wrist Ext/Flex 60/60 65/65    Wrist RD/UD 25/30 25/30        Strength (Dynamometer) and Pinch Strength (Pinch Gauge)  Measured in pounds.    11/13/2023 11/13/2023 12/6/2023 12/15/2023     Left Right Right Right   Rung  55 *point of pain 80 *point of pain 90 +10 no pain   Rung II elbow extended  95 30 *point of pain 40* point of pain  (Post manual 80 no pain) 60 * point of pain   80 max   Key Pinch 24 21 NT 21   3pt Pinch 21 19 point of pain NT 20      Sensation: less burning in the long finger knuckle        Manual Muscle Test    11/13/2023 11/13/2023     Left Right   Wrist Extension  5/5 5/5   Wrist Flexion 5/5 5/5    Elbow Extension 5/5 4/5   Elbow Flexion 5/5 5/5           Treatment     Fabian received the treatments listed below:     Modalities: moist heat pack x 10 min to elbow              Therapeutic exercises to develop ROM for  40 minutes, including:     UBE  X 3 min forward, 3 min backward level 7   Elbow flexion 3 ways X 6# x 20 each way   Nirschl program 1  20 reps each with 3# weight  Wrist flex/ext with elbow flex  Wrist UD/RD with elbow flex  Sup/ pro with elbow flex  EDC with yellow Rubber band  Stress ball gripping palm up. ( Can use tennis ball is bothers IF)    Nirschl Program 2 2# x 20 reps each elbow extended   Red flex bar  X 20 reps maya twist       Red theraband NT X 10 reps supinated elbow extension  X 20 reps rows with tactile assist for shoulder position             Patient Education and Home Exercises      Education provided:   - Cont HEP  - Progress towards goals     Written Home Exercises Provided: Patient instructed to cont prior HEP.  Exercises were reviewed and Fabian was able to demonstrate them prior to the end of the session.  Fabian demonstrated good  understanding of the HEP provided. See EMR under Patient Instructions for exercises provided during therapy sessions.      ASSESSMENT    He reports decrease in pain overall and compliance with HEP.  No pain with UBE this date able to progress a level up on resistance. Pt. Reports no increase in pain following session. Pt. Reports doing taping at home as well. He is motivated. Able to complete nirschl 2 program with 2 #weight today with good participation.  Will continue to progress.     Fabian is progressing  towards his goals and there are no updates to goals at this time. Pt prognosis is Good.     Pt will continue to benefit from skilled outpatient occupational therapy to address the deficits listed in the problem list on initial evaluation, provide pt/family education and to maximize pt's level of independence in the home and community environment.      Pt's spiritual, cultural and educational needs considered and pt agreeable to plan of care and goals.    Anticipated barriers to occupational therapy: has to do some work    Goals:   The following goals were discussed with the patient and patient is in agreement with them as to be addressed in the treatment plan.      Long Term Goals (LTGs); to be met by discharge.  LTG #1: Pt will report a pain level of 2 out of 10 with ADLs-progressing  LTG #2: Pt will demo improved FOTO score by 20 points. progressing  LTG #3: Pt will return to prior level of function for ADLs and household management. progressing     Short Term Goals (STGs); to be met within 4 weeks (12/15/2023).  STG #1: Pt will report 4 out of 10 pain level with ADLs.progressing  STG #2: Pt will report/demo [place] with riding motorcyle.progressing  STG #3: Pt will report/demo [place] with bathing .progressing  STG #4: Pt will demonstrate independence with issued HEP. progressing  STG #5: Pt will demo improved  strength by 5# in order to increase ability to perform simple housework tasks. MET 12/6/2023    PLAN     Continue skilled occupational therapy with individualized plan of care focusing on decreasing pain and increasing function    Updates/Grading for next session: cont per tolerance    Elaina Moore, OTR/L,CHT

## 2024-02-14 RX ORDER — MELOXICAM 15 MG/1
15 TABLET ORAL
Qty: 30 TABLET | Refills: 1 | Status: SHIPPED | OUTPATIENT
Start: 2024-02-14 | End: 2024-03-13 | Stop reason: SDUPTHER

## 2024-02-27 DIAGNOSIS — Z00.00 ENCOUNTER FOR MEDICARE ANNUAL WELLNESS EXAM: ICD-10-CM

## 2024-03-04 ENCOUNTER — PATIENT MESSAGE (OUTPATIENT)
Dept: ADMINISTRATIVE | Facility: HOSPITAL | Age: 64
End: 2024-03-04
Payer: MEDICARE

## 2024-03-08 DIAGNOSIS — E78.2 MIXED HYPERLIPIDEMIA: ICD-10-CM

## 2024-03-08 RX ORDER — ATORVASTATIN CALCIUM 40 MG/1
40 TABLET, FILM COATED ORAL DAILY
Qty: 90 TABLET | Refills: 0 | Status: SHIPPED | OUTPATIENT
Start: 2024-03-08 | End: 2024-06-07

## 2024-03-08 NOTE — TELEPHONE ENCOUNTER
Refill Routing Note   Medication(s) are not appropriate for processing by [facility] for the following reason(s):        Required labs outdated    [facility] action(s):  Defer     Requires labs : Yes             Appointments  past 12m or future 3m with PCP    Date Provider   Last Visit   7/19/2023 Dieter Rice MD   Next Visit   Visit date not found Dieter Rice MD   ED visits in past 90 days: 0        Note composed:5:44 AM 03/08/2024

## 2024-03-08 NOTE — TELEPHONE ENCOUNTER
Care Due:                  Date            Visit Type   Department     Provider  --------------------------------------------------------------------------------                                EP -                              PRIMARY      [facility] INTERNAL  Last Visit: 07-      CARE ([facility])   MEDICINE       Dieter Rice  Next Visit: None Scheduled  None         None Found                                                            Last  Test          Frequency    Reason                     Performed    Due Date  --------------------------------------------------------------------------------    Lipid Panel.  12 months..  atorvastatin.............  01-   01-    [facility] Embedded Care Due Messages. Reference number: [id number].   3/08/2024 4:35:55 AM CST

## 2024-03-13 ENCOUNTER — LAB VISIT (OUTPATIENT)
Dept: LAB | Facility: OTHER | Age: 64
End: 2024-03-13
Attending: INTERNAL MEDICINE
Payer: MEDICARE

## 2024-03-13 ENCOUNTER — OFFICE VISIT (OUTPATIENT)
Dept: INTERNAL MEDICINE | Facility: CLINIC | Age: 64
End: 2024-03-13
Attending: INTERNAL MEDICINE
Payer: MEDICARE

## 2024-03-13 ENCOUNTER — PATIENT MESSAGE (OUTPATIENT)
Dept: ORTHOPEDICS | Facility: CLINIC | Age: 64
End: 2024-03-13
Payer: MEDICARE

## 2024-03-13 VITALS
WEIGHT: 227.75 LBS | HEART RATE: 71 BPM | SYSTOLIC BLOOD PRESSURE: 140 MMHG | OXYGEN SATURATION: 96 % | HEIGHT: 71 IN | BODY MASS INDEX: 31.89 KG/M2 | DIASTOLIC BLOOD PRESSURE: 98 MMHG

## 2024-03-13 DIAGNOSIS — G47.33 OSA (OBSTRUCTIVE SLEEP APNEA): ICD-10-CM

## 2024-03-13 DIAGNOSIS — I10 BENIGN ESSENTIAL HYPERTENSION: ICD-10-CM

## 2024-03-13 DIAGNOSIS — Z12.5 PROSTATE CANCER SCREENING: ICD-10-CM

## 2024-03-13 DIAGNOSIS — Z87.891 FORMER SMOKER: ICD-10-CM

## 2024-03-13 DIAGNOSIS — R73.03 PREDIABETES: ICD-10-CM

## 2024-03-13 DIAGNOSIS — R79.9 ABNORMAL FINDING OF BLOOD CHEMISTRY, UNSPECIFIED: ICD-10-CM

## 2024-03-13 DIAGNOSIS — Z00.00 ANNUAL PHYSICAL EXAM: Primary | ICD-10-CM

## 2024-03-13 DIAGNOSIS — Z00.00 ANNUAL PHYSICAL EXAM: ICD-10-CM

## 2024-03-13 DIAGNOSIS — E78.2 MIXED HYPERLIPIDEMIA: ICD-10-CM

## 2024-03-13 DIAGNOSIS — E66.9 CLASS 1 OBESITY WITH SERIOUS COMORBIDITY AND BODY MASS INDEX (BMI) OF 31.0 TO 31.9 IN ADULT, UNSPECIFIED OBESITY TYPE: ICD-10-CM

## 2024-03-13 DIAGNOSIS — R73.03 PRE-DIABETES: ICD-10-CM

## 2024-03-13 LAB
ALBUMIN SERPL BCP-MCNC: 4.1 G/DL (ref 3.5–5.2)
ALP SERPL-CCNC: 72 U/L (ref 55–135)
ALT SERPL W/O P-5'-P-CCNC: 39 U/L (ref 10–44)
ANION GAP SERPL CALC-SCNC: 10 MMOL/L (ref 8–16)
AST SERPL-CCNC: 29 U/L (ref 10–40)
BASOPHILS # BLD AUTO: 0.05 K/UL (ref 0–0.2)
BASOPHILS NFR BLD: 1.1 % (ref 0–1.9)
BILIRUB SERPL-MCNC: 0.5 MG/DL (ref 0.1–1)
BUN SERPL-MCNC: 23 MG/DL (ref 8–23)
CALCIUM SERPL-MCNC: 9.4 MG/DL (ref 8.7–10.5)
CHLORIDE SERPL-SCNC: 105 MMOL/L (ref 95–110)
CHOLEST SERPL-MCNC: 126 MG/DL (ref 120–199)
CHOLEST/HDLC SERPL: 3.3 {RATIO} (ref 2–5)
CO2 SERPL-SCNC: 27 MMOL/L (ref 23–29)
COMPLEXED PSA SERPL-MCNC: 1.8 NG/ML (ref 0–4)
CREAT SERPL-MCNC: 1 MG/DL (ref 0.5–1.4)
DIFFERENTIAL METHOD BLD: ABNORMAL
EOSINOPHIL # BLD AUTO: 0.2 K/UL (ref 0–0.5)
EOSINOPHIL NFR BLD: 3.7 % (ref 0–8)
ERYTHROCYTE [DISTWIDTH] IN BLOOD BY AUTOMATED COUNT: 12.6 % (ref 11.5–14.5)
EST. GFR  (NO RACE VARIABLE): >60 ML/MIN/1.73 M^2
ESTIMATED AVG GLUCOSE: 134 MG/DL (ref 68–131)
GLUCOSE SERPL-MCNC: 110 MG/DL (ref 70–110)
HBA1C MFR BLD: 6.3 % (ref 4–5.6)
HCT VFR BLD AUTO: 43 % (ref 40–54)
HDLC SERPL-MCNC: 38 MG/DL (ref 40–75)
HDLC SERPL: 30.2 % (ref 20–50)
HGB BLD-MCNC: 14.1 G/DL (ref 14–18)
IMM GRANULOCYTES # BLD AUTO: 0.01 K/UL (ref 0–0.04)
IMM GRANULOCYTES NFR BLD AUTO: 0.2 % (ref 0–0.5)
LDLC SERPL CALC-MCNC: 60.4 MG/DL (ref 63–159)
LYMPHOCYTES # BLD AUTO: 1.7 K/UL (ref 1–4.8)
LYMPHOCYTES NFR BLD: 36.4 % (ref 18–48)
MCH RBC QN AUTO: 31.3 PG (ref 27–31)
MCHC RBC AUTO-ENTMCNC: 32.8 G/DL (ref 32–36)
MCV RBC AUTO: 95 FL (ref 82–98)
MONOCYTES # BLD AUTO: 0.5 K/UL (ref 0.3–1)
MONOCYTES NFR BLD: 10.4 % (ref 4–15)
NEUTROPHILS # BLD AUTO: 2.2 K/UL (ref 1.8–7.7)
NEUTROPHILS NFR BLD: 48.2 % (ref 38–73)
NONHDLC SERPL-MCNC: 88 MG/DL
NRBC BLD-RTO: 0 /100 WBC
PLATELET # BLD AUTO: 208 K/UL (ref 150–450)
PMV BLD AUTO: 10.2 FL (ref 9.2–12.9)
POTASSIUM SERPL-SCNC: 4.6 MMOL/L (ref 3.5–5.1)
PROT SERPL-MCNC: 7.3 G/DL (ref 6–8.4)
RBC # BLD AUTO: 4.51 M/UL (ref 4.6–6.2)
SODIUM SERPL-SCNC: 142 MMOL/L (ref 136–145)
TRIGL SERPL-MCNC: 138 MG/DL (ref 30–150)
TSH SERPL DL<=0.005 MIU/L-ACNC: 2.65 UIU/ML (ref 0.4–4)
WBC # BLD AUTO: 4.61 K/UL (ref 3.9–12.7)

## 2024-03-13 PROCEDURE — 84443 ASSAY THYROID STIM HORMONE: CPT | Performed by: INTERNAL MEDICINE

## 2024-03-13 PROCEDURE — 99214 OFFICE O/P EST MOD 30 MIN: CPT | Mod: S$PBB,,, | Performed by: INTERNAL MEDICINE

## 2024-03-13 PROCEDURE — 99999 PR PBB SHADOW E&M-EST. PATIENT-LVL IV: CPT | Mod: PBBFAC,,, | Performed by: INTERNAL MEDICINE

## 2024-03-13 PROCEDURE — 80053 COMPREHEN METABOLIC PANEL: CPT | Performed by: INTERNAL MEDICINE

## 2024-03-13 PROCEDURE — 83036 HEMOGLOBIN GLYCOSYLATED A1C: CPT | Performed by: INTERNAL MEDICINE

## 2024-03-13 PROCEDURE — 80061 LIPID PANEL: CPT | Performed by: INTERNAL MEDICINE

## 2024-03-13 PROCEDURE — 84153 ASSAY OF PSA TOTAL: CPT | Performed by: INTERNAL MEDICINE

## 2024-03-13 PROCEDURE — 99214 OFFICE O/P EST MOD 30 MIN: CPT | Mod: PBBFAC | Performed by: INTERNAL MEDICINE

## 2024-03-13 PROCEDURE — 36415 COLL VENOUS BLD VENIPUNCTURE: CPT | Performed by: INTERNAL MEDICINE

## 2024-03-13 PROCEDURE — 85025 COMPLETE CBC W/AUTO DIFF WBC: CPT | Performed by: INTERNAL MEDICINE

## 2024-03-13 RX ORDER — MELOXICAM 15 MG/1
15 TABLET ORAL DAILY
Qty: 30 TABLET | Refills: 1 | Status: SHIPPED | OUTPATIENT
Start: 2024-03-13 | End: 2024-05-11 | Stop reason: SDUPTHER

## 2024-03-13 RX ORDER — TIRZEPATIDE 2.5 MG/.5ML
2.5 INJECTION, SOLUTION SUBCUTANEOUS
Qty: 4 PEN | Refills: 0 | Status: SHIPPED | OUTPATIENT
Start: 2024-03-13

## 2024-03-13 RX ORDER — VALSARTAN 80 MG/1
80 TABLET ORAL DAILY
Qty: 90 TABLET | Refills: 2 | Status: SHIPPED | OUTPATIENT
Start: 2024-03-13

## 2024-03-13 NOTE — PROGRESS NOTES
"Subjective:       Patient ID: Fabian Ivy is a 63 y.o. male.    Chief Complaint: Annual Exam    Here for non urgent visit    Increase valsartan to 80mg.     64 yo M with PMHx of obesity BMI 31.7, HTN, HLD, TORY, preDM and pt would like to try medication assistance for weight loss in addition to diet and lifestyle changes. Bariatric medicine clinic discussed. GLP agonists and their risk of thyroid cancer, pancreatitis, and dyspepsia discussed. Pt does not have a family hx of MEN, thyroid CA, pancreatitis, nor a personal Hx of pancreatitis. Pt is aware of risk and amenable. Pt aware we may be unable to obtain medications due to insurance restrictions and lack of diagnosis of diabetes.     ### HTN ###  Valsartan 40mg     ### HLD ###  Lipitor 40mg        Review of Systems   Constitutional:  Negative for appetite change, chills, fever and unexpected weight change.   HENT:  Negative for hearing loss, sore throat and trouble swallowing.    Eyes:  Negative for visual disturbance.   Respiratory:  Negative for cough, chest tightness and shortness of breath.    Cardiovascular:  Negative for chest pain and leg swelling.   Gastrointestinal:  Negative for abdominal pain, blood in stool, constipation, diarrhea, nausea and vomiting.   Endocrine: Negative for polydipsia and polyuria.   Genitourinary:  Negative for decreased urine volume, difficulty urinating, dysuria, frequency and urgency.   Musculoskeletal:  Negative for gait problem.   Skin:  Negative for rash.   Neurological:  Negative for dizziness and numbness.   Psychiatric/Behavioral:  The patient is not nervous/anxious.        Objective:      Vitals:    03/13/24 0835 03/13/24 0903   BP: (!) 142/80 (!) 140/98   BP Location: Left arm Left arm   Patient Position: Sitting Sitting   Pulse: 71    SpO2: 96%    Weight: 103.3 kg (227 lb 11.8 oz)    Height: 5' 11" (1.803 m)       Physical Exam  Vitals and nursing note reviewed.   Constitutional:       General: He is not in acute " distress.     Appearance: Normal appearance. He is well-developed.   HENT:      Head: Normocephalic and atraumatic.      Mouth/Throat:      Pharynx: No oropharyngeal exudate.   Eyes:      General: No scleral icterus.     Conjunctiva/sclera: Conjunctivae normal.      Pupils: Pupils are equal, round, and reactive to light.   Neck:      Thyroid: No thyromegaly.   Cardiovascular:      Rate and Rhythm: Normal rate and regular rhythm.      Heart sounds: Normal heart sounds. No murmur heard.  Pulmonary:      Effort: Pulmonary effort is normal.      Breath sounds: Normal breath sounds. No wheezing or rales.   Abdominal:      General: There is no distension.   Musculoskeletal:         General: No tenderness.   Lymphadenopathy:      Cervical: No cervical adenopathy.   Skin:     General: Skin is warm and dry.   Neurological:      Mental Status: He is alert and oriented to person, place, and time.   Psychiatric:         Behavior: Behavior normal.         Assessment:       1. Annual physical exam    2. Benign essential hypertension    3. Former smoker    4. TORY (obstructive sleep apnea)    5. Pre-diabetes    6. Prostate cancer screening    7. Mixed hyperlipidemia    8. Class 1 obesity with serious comorbidity and body mass index (BMI) of 31.0 to 31.9 in adult, unspecified obesity type    9. Abnormal finding of blood chemistry, unspecified    10. Prediabetes        Plan:       Fabian was seen today for annual exam.    Diagnoses and all orders for this visit:    Annual physical exam  -     Comprehensive Metabolic Panel; Future  -     Lipid Panel; Future  -     TSH; Future  -     CBC Auto Differential; Future  -     Hemoglobin A1C; Future    Benign essential hypertension  -     tirzepatide, weight loss, (ZEPBOUND) 2.5 mg/0.5 mL PnIj; Inject 2.5 mg into the skin every 7 days.  -     valsartan (DIOVAN) 80 MG tablet; Take 1 tablet (80 mg total) by mouth once daily.    Former smoker   .[facility]  TORY (obstructive sleep apnea)  -      tirzepatide, weight loss, (ZEPBOUND) 2.5 mg/0.5 mL PnIj; Inject 2.5 mg into the skin every 7 days.    Pre-diabetes  -     tirzepatide, weight loss, (ZEPBOUND) 2.5 mg/0.5 mL PnIj; Inject 2.5 mg into the skin every 7 days.    Prostate cancer screening  -     PSA, Screening; Future    Mixed hyperlipidemia     Class 1 obesity with serious comorbidity and body mass index (BMI) of 31.0 to 31.9 in adult, unspecified obesity type    Abnormal finding of blood chemistry, unspecified  -     Lipid Panel; Future  -     CBC Auto Differential; Future  -     Hemoglobin A1C; Future    Prediabetes  -     TSH; Future           Dieter Singh MD  Internal Medicine-[facility]        Side effects of medication(s) were discussed in detail and patient voiced understanding.  Patient will call back for any issues or complications.

## 2024-05-04 ENCOUNTER — PATIENT MESSAGE (OUTPATIENT)
Dept: ADMINISTRATIVE | Facility: CLINIC | Age: 64
End: 2024-05-04
Payer: MEDICARE

## 2024-05-06 ENCOUNTER — TELEPHONE (OUTPATIENT)
Dept: ADMINISTRATIVE | Facility: CLINIC | Age: 64
End: 2024-05-06
Payer: MEDICARE

## 2024-05-08 ENCOUNTER — OFFICE VISIT (OUTPATIENT)
Dept: HOME HEALTH SERVICES | Facility: CLINIC | Age: 64
End: 2024-05-08
Payer: MEDICARE

## 2024-05-08 DIAGNOSIS — Z00.00 ENCOUNTER FOR PREVENTIVE HEALTH EXAMINATION: Primary | ICD-10-CM

## 2024-05-08 DIAGNOSIS — Z00.00 ENCOUNTER FOR MEDICARE ANNUAL WELLNESS EXAM: ICD-10-CM

## 2024-05-08 PROCEDURE — G0439 PPPS, SUBSEQ VISIT: HCPCS | Mod: 95,,, | Performed by: NURSE PRACTITIONER

## 2024-05-08 NOTE — PROGRESS NOTES
The patient location is: [place]  The chief complaint leading to consultation is: awv    Visit type: audiovisual    Face to Face time with patient: 60  60 minutes of total time spent on the encounter, which includes face to face time and non-face to face time preparing to see the patient (eg, review of tests), Obtaining and/or reviewing separately obtained history, Documenting clinical information in the electronic or other health record, Independently interpreting results (not separately reported) and communicating results to the patient/family/caregiver, or Care coordination (not separately reported).         Each patient to whom he or she provides medical services by telemedicine is:  (1) informed of the relationship between the physician and patient and the respective role of any other health care provider with respect to management of the patient; and (2) notified that he or she may decline to receive medical services by telemedicine and may withdraw from such care at any time.    Notes:       Fabian Ivy presented for an initial Medicare AWV today. The following components were reviewed and updated:    Medical history  Family History  Social history  Allergies and Current Medications  Health Risk Assessment  Health Maintenance  Care Team    **See Completed Assessments for Annual Wellness visit with in the encounter summary    The following assessments were completed:  Depression Screening  Cognitive function Screening  Timed Get Up Test  Whisper Test      Opioid documentation:      Patient does have a current opioid prescription.      Patient accepted further discussion regarding opioid medication use.      Patient is currently taking hydrocodone narcotic for back pain.        Pain level today is 4/10.       In addition to narcotic pain medications, patient is also using (no other oral, topical, or alternative treatments) for pain control.       Patient is followed by a specialist currently for their pain  and will not be referred today.       Patient's opioid risk potential based on ORT-OUD tool:       Cam each box that applies   No   Yes     Family history of substance abuse   Alcohol [] []   Illegal drugs [] []   Rx drugs [] []     Personal history of substance abuse   Alcohol [] []   Illegal drugs [] []   Rx drugs [] []     Age between 16-45 years   []   []     Patient with ADD, OCD, Bipolar disorder, schizoprenia   []   []     Patient with depression   []   []                         Scoring total                                                                 Non-opioid treatment options have been discussed today and added to the patient's after visit summary.          There were no vitals filed for this visit.  There is no height or weight on file to calculate BMI.       Physical Exam  Constitutional:       Appearance: Normal appearance.   Neurological:      Mental Status: He is alert.   Psychiatric:         Attention and Perception: Attention and perception normal.         Mood and Affect: Mood and affect normal.         Speech: Speech normal.         Behavior: Behavior normal. Behavior is cooperative.         Thought Content: Thought content normal.         Cognition and Memory: Cognition and memory normal.         Judgment: Judgment normal.           Diagnoses and health risks identified today and associated recommendations/orders:  1. Encounter for preventive health examination  Stable, followed by provider    2. Encounter for Medicare annual wellness exam  Stable, followed by provider  - Ambulatory Referral/Consult to Enhanced Annual Wellness Visit (eAWV)      Provided Fabian with a 5-10 year written screening schedule and personal prevention plan. Recommendations were developed using the USPSTF age appropriate recommendations. Education, counseling, and referrals were provided as needed.  After Visit Summary printed and given to patient which includes a list of additional screenings\tests needed.    Follow  up in about 1 year (around 5/8/2025) for your next annual wellness visit.      Dominic Levine, ASHLEY  I offered to discuss advanced care planning, including how to pick a person who would make decisions for you if you were unable to make them for yourself, called a health care power of , and what kind of decisions you might make such as use of life sustaining treatments such as ventilators and tube feeding when faced with a life limiting illness recorded on a living will that they will need to know. (How you want to be cared for as you near the end of your natural life)     X  Patient has advanced directives on file, which we reviewed, and they do not wish to make changes.

## 2024-05-08 NOTE — PATIENT INSTRUCTIONS
Counseling and Referral of Other Preventative  (Italic type indicates deductible and co-insurance are waived)    Patient Name: Fabian Ivy  Today's Date: 5/8/2024    Health Maintenance       Date Due Completion Date    Abdominal Aortic Aneurysm Screening 04/15/2023 11/18/2015    COVID-19 Vaccine (7 - 2023-24 season) 02/09/2024 10/9/2023    PROSTATE-SPECIFIC ANTIGEN 03/13/2025 3/13/2024    Hemoglobin A1c (Prediabetes) 03/13/2025 3/13/2024    Colorectal Cancer Screening 05/25/2026 5/25/2021    Lipid Panel 03/13/2029 3/13/2024    TETANUS VACCINE 04/11/2034 4/11/2024        No orders of the defined types were placed in this encounter.      The following information is provided to all patients.  This information is to help you find resources for any of the problems found today that may be affecting your health:                  Living healthy guide: www.[facility].[place].[facility]      Understanding Diabetes: www.diabetes.org      Eating healthy: www.cdc.gov/healthyweight      CDC home safety checklist: www.cdc.gov/steadi/patient.html      Agency on Aging: www.goea.[place].[facility]      Alcoholics anonymous (AA): www.aa.org      Physical Activity: www.vijay.nih.gov/yc5krrg      Tobacco use: www.quitwithusla.org

## 2024-05-11 RX ORDER — MELOXICAM 15 MG/1
TABLET ORAL
Qty: 30 TABLET | Refills: 1 | Status: SHIPPED | OUTPATIENT
Start: 2024-05-11

## 2024-05-20 ENCOUNTER — TELEPHONE (OUTPATIENT)
Dept: ORTHOPEDICS | Facility: CLINIC | Age: 64
End: 2024-05-20
Payer: MEDICARE

## 2024-05-20 NOTE — TELEPHONE ENCOUNTER
"----- Message from Debra Hess sent at 5/20/2024 11:56 AM CDT -----  Type:  Sooner Apoointment Request    Caller is requesting a sooner appointment.  Caller declined first available appointment listed below.  Caller will not accept being placed on the waitlist and is requesting a message be sent to doctor.  Name of Caller:pt  When is the first available appointment?06/13/24  Symptoms:left hand pain /My thumb recently started "popping" and is basically unusable  Would the patient rather a call back or a response via [facility]? Call   Best Call Back Number: [phone]  Additional Information: pt also stated that he is not able to use left hand  "

## 2024-05-22 ENCOUNTER — OFFICE VISIT (OUTPATIENT)
Dept: ORTHOPEDICS | Facility: CLINIC | Age: 64
End: 2024-05-22
Payer: MEDICARE

## 2024-05-22 VITALS — WEIGHT: 229.25 LBS | BODY MASS INDEX: 32.1 KG/M2 | HEIGHT: 71 IN

## 2024-05-22 DIAGNOSIS — M65.312 TRIGGER FINGER OF LEFT THUMB: Primary | ICD-10-CM

## 2024-05-22 PROCEDURE — 20550 NJX 1 TENDON SHEATH/LIGAMENT: CPT | Mod: S$PBB,LT,, | Performed by: PHYSICIAN ASSISTANT

## 2024-05-22 PROCEDURE — 99213 OFFICE O/P EST LOW 20 MIN: CPT | Mod: PBBFAC,PN | Performed by: PHYSICIAN ASSISTANT

## 2024-05-22 PROCEDURE — 99213 OFFICE O/P EST LOW 20 MIN: CPT | Mod: S$PBB,25,, | Performed by: PHYSICIAN ASSISTANT

## 2024-05-22 PROCEDURE — 99999 PR PBB SHADOW E&M-EST. PATIENT-LVL III: CPT | Mod: PBBFAC,,, | Performed by: PHYSICIAN ASSISTANT

## 2024-05-22 PROCEDURE — 20550 NJX 1 TENDON SHEATH/LIGAMENT: CPT | Mod: PBBFAC,PN,LT | Performed by: PHYSICIAN ASSISTANT

## 2024-05-22 PROCEDURE — 99999PBSHW PR PBB SHADOW TECHNICAL ONLY FILED TO HB: Mod: PBBFAC,,,

## 2024-05-22 RX ORDER — TRIAMCINOLONE ACETONIDE 40 MG/ML
20 INJECTION, SUSPENSION INTRA-ARTICULAR; INTRAMUSCULAR
Status: DISCONTINUED | OUTPATIENT
Start: 2024-05-22 | End: 2024-05-22 | Stop reason: HOSPADM

## 2024-05-22 RX ADMIN — TRIAMCINOLONE ACETONIDE 20 MG: 40 INJECTION, SUSPENSION INTRA-ARTICULAR; INTRAMUSCULAR at 08:05

## 2024-05-22 NOTE — PROCEDURES
Tendon Sheath    Date/Time: 5/22/2024 8:30 AM    Performed by: Laurence Razo PA-C  Authorized by: Laurence Razo PA-C    Consent Done?:  Yes (Verbal)  Indications:  Pain  Site marked: the procedure site was marked    Timeout: prior to procedure the correct patient, procedure, and site was verified    Location:  Thumb  Site:  L thumb flexor tendon sheath  Ultrasonic guidance for needle placement?: No    Needle size:  25 G  Approach:  Volar  Medications:  20 mg triamcinolone acetonide 40 mg/mL  Patient tolerance:  Patient tolerated the procedure well with no immediate complications    PROCEDURE:  I have explained the risks, benefits, and alternatives of the procedure in detail.  The patient voices understanding, gives consent, and all questions have been answered.  Pause for timeout. A sterile prep of the skin performed, then the LEFT thumb finger flexor tendon is injected using a 25 gauge needle with a combination of 0.5 cc 1% plain xylocaine and 20 mg of Kenalog.  The remaining 20 mg of Kenolog was properly wasted. The patient is cautioned and immediate relief of pain is secondary to the local anesthetic and will be temporary.  After the anesthetic wears off there may be a increase in pain that may last for a few hours or a few days and they should use ice to help alleviate this flair up of pain. Patient tolerated the procedure well.

## 2024-05-22 NOTE — PROGRESS NOTES
Subjective:      Patient ID: Fabian Ivy is a 64 y.o. male.    Chief Complaint: Pain of the Left Hand (Thumb )      HPI  Fabian Ivy is a  64 y.o. male returns to the clinic today for new onset of left thumb popping.    He was last seen and treated in our clinic for symptoms of right tennis elbow.    Today, he reports 2 months of left thumb pain that worsens and pops when trying to flex and extend the digit.  He denies symptoms to additional digits.  Denies sensation changes.  He has not sought treatment for this condition in the past.    Review of patient's allergies indicates:  No Known Allergies      Current Outpatient Medications   Medication Sig Dispense Refill    atorvastatin (LIPITOR) 40 MG tablet Take 1 tablet (40 mg total) by mouth once daily. 90 tablet 0    azithromycin (Z-ANALI) 250 MG tablet Take 250 mg by mouth.      chlorhexidine (PERIDEX) 0.12 % solution SMARTSIG:By Mouth      cyclobenzaprine (FLEXERIL) 10 MG tablet Take 10 mg by mouth 3 (three) times daily as needed.      diclofenac sodium (VOLTAREN) 1 % Gel Apply 2 g topically 2 (two) times daily. 450 g 2    diclofenac sodium (VOLTAREN) 1 % Gel Apply 2 g topically 3 (three) times daily. 100 g 1    HYDROcodone-acetaminophen (NORCO)  mg per tablet Take 1 tablet by mouth every 12 (twelve) hours as needed.      ketoconazole (NIZORAL) 2 % cream Apply topically 2 (two) times daily. Use for minimum of 2 weeks 15 g 0    meloxicam (MOBIC) 15 MG tablet TAKE 1 TABLET(15 MG) BY MOUTH DAILY 30 tablet 1    PFIZER COVID-19 BUCK VACCN,PF, 30 mcg/0.3 mL injection       pneumoc 20-shani conj-dip cr,PF, (PREVNAR-20, PF,) 0.5 mL Syrg injection Inject into the muscle. 0.5 mL 0    tirzepatide, weight loss, (ZEPBOUND) 2.5 mg/0.5 mL PnIj Inject 2.5 mg into the skin every 7 days. 4 Pen 0    valsartan (DIOVAN) 80 MG tablet Take 1 tablet (80 mg total) by mouth once daily. 90 tablet 2     No current facility-administered medications for this visit.       Past Medical  "History:   Diagnosis Date    Hepatitis C     s/p harvone    Hypertension        History reviewed. No pertinent surgical history.    Review of Systems:  Review of Systems   Constitutional:  Negative for chills and fever.   Respiratory:  Negative for shortness of breath.    Cardiovascular:  Negative for chest pain and leg swelling.   Gastrointestinal:  Negative for nausea and vomiting.   Genitourinary:  Negative for dysuria.   Musculoskeletal:  Positive for joint pain. Negative for falls.   Skin:  Negative for rash.   Neurological:  Negative for dizziness and sensory change.         OBJECTIVE:     PHYSICAL EXAM:  Height: 5' 11" (180.3 cm) Weight: 104 kg (229 lb 4.5 oz)  Vitals:    05/22/24 0828   Weight: 104 kg (229 lb 4.5 oz)   Height: 5' 11" (1.803 m)   PainSc:   8       General  General: alert & oriented x 3, NAD, sitting comfortably in the exam room  Resp: breathing unlabored  GI: abdomen soft and nondistended  Psych: mood and affect appropriate  HEENT: PERRLA    Examination LEFT HAND/THUMB:   Skin: No evidence of lacerations, lesions, open wounds or visible deformity.  Swelling: negative throughout  Palpation:   TTP (+) to A1 pulley of thumb.   Otherwise, NTTP to bony prominences and soft tissues throughout.   ROM (active and passive):  Full to WRIST flex/ext/radial and ulnar deviation.  Full to DIGIT MCP/PIP/DIP digits without pain or difficulty   (+) palpable triggering to thumb  Sensation: grossly intact to radial/median/ulnar nerve distributions  Special Testing: Tinels negative. Finkelstein negative. Thumb CMC grind (negative)  NV: Pulses palpable. Cap refill brisk    Imaging:  No new imaging performed today    ASSESSMENT/PLAN:     1. Trigger finger of left thumb           The diagnosis, anatomy and pathophysiology was reviewed at the bedside as was conservative and surgical treatment options.    Local corticosteroid was recommended to help alleviate both pain and mechanical symptoms to left " thumb.    Injection performed.  Standard precautions provided    Follow Up:  3 months for re-evaluation    Consider surgical trigger finger release if symptoms persist or worsen

## 2024-06-07 DIAGNOSIS — E78.2 MIXED HYPERLIPIDEMIA: ICD-10-CM

## 2024-06-07 RX ORDER — ATORVASTATIN CALCIUM 40 MG/1
40 TABLET, FILM COATED ORAL
Qty: 90 TABLET | Refills: 3 | Status: SHIPPED | OUTPATIENT
Start: 2024-06-07

## 2024-06-07 NOTE — TELEPHONE ENCOUNTER
Refill Decision Note   Fabian Biju  is requesting a refill authorization.    Brief Assessment and Rationale for Refill:   Approve       Medication Therapy Plan:         Comments:     Note composed:12:59 PM 06/07/2024

## 2024-06-07 NOTE — TELEPHONE ENCOUNTER
No care due was identified.  [facility] Embedded Care Due Messages. Reference number: [id number].   6/07/2024 12:57:47 PM CDT

## 2024-07-08 RX ORDER — MELOXICAM 15 MG/1
TABLET ORAL
Qty: 30 TABLET | Refills: 1 | Status: SHIPPED | OUTPATIENT
Start: 2024-07-08

## 2024-07-09 ENCOUNTER — ON-DEMAND VIRTUAL (OUTPATIENT)
Dept: URGENT CARE | Facility: CLINIC | Age: 64
End: 2024-07-09
Payer: MEDICARE

## 2024-07-09 DIAGNOSIS — J06.9 UPPER RESPIRATORY TRACT INFECTION, UNSPECIFIED TYPE: Primary | ICD-10-CM

## 2024-07-09 PROCEDURE — 99212 OFFICE O/P EST SF 10 MIN: CPT | Mod: 95,,, | Performed by: INTERNAL MEDICINE

## 2024-07-09 NOTE — PROGRESS NOTES
Subjective:      Patient ID: Fabian Ivy is a 64 y.o. male.    Vitals:  vitals were not taken for this visit.     Chief Complaint: Sore Throat      Visit Type: TELE AUDIOVISUAL    Present with the patient at the time of consultation: TELEMED PRESENT WITH PATIENT: None    Past Medical History:   Diagnosis Date    Hepatitis C     s/p harvone    Hypertension      History reviewed. No pertinent surgical history.  Review of patient's allergies indicates:  No Known Allergies  Current Outpatient Medications on File Prior to Visit   Medication Sig Dispense Refill    atorvastatin (LIPITOR) 40 MG tablet TAKE 1 TABLET(40 MG) BY MOUTH DAILY 90 tablet 3    azithromycin (Z-ANALI) 250 MG tablet Take 250 mg by mouth.      chlorhexidine (PERIDEX) 0.12 % solution SMARTSIG:By Mouth      cyclobenzaprine (FLEXERIL) 10 MG tablet Take 10 mg by mouth 3 (three) times daily as needed.      diclofenac sodium (VOLTAREN) 1 % Gel Apply 2 g topically 2 (two) times daily. 450 g 2    diclofenac sodium (VOLTAREN) 1 % Gel Apply 2 g topically 3 (three) times daily. 100 g 1    HYDROcodone-acetaminophen (NORCO)  mg per tablet Take 1 tablet by mouth every 12 (twelve) hours as needed.      ketoconazole (NIZORAL) 2 % cream Apply topically 2 (two) times daily. Use for minimum of 2 weeks 15 g 0    meloxicam (MOBIC) 15 MG tablet TAKE 1 TABLET(15 MG) BY MOUTH DAILY 30 tablet 1    PFIZER COVID-19 BUCK VACCN,PF, 30 mcg/0.3 mL injection       pneumoc 20-shani conj-dip cr,PF, (PREVNAR-20, PF,) 0.5 mL Syrg injection Inject into the muscle. 0.5 mL 0    tirzepatide, weight loss, (ZEPBOUND) 2.5 mg/0.5 mL PnIj Inject 2.5 mg into the skin every 7 days. 4 Pen 0    valsartan (DIOVAN) 80 MG tablet Take 1 tablet (80 mg total) by mouth once daily. 90 tablet 2     No current facility-administered medications on file prior to visit.     Family History   Problem Relation Name Age of Onset    Cancer Father          lung           Ohs Peq Odvv Intake    7/9/2024 11:38 AM CDT  - Filed by Patient   What is your current physical address in the event of a medical emergency? [address] [place] [address]   Are you able to take your vital signs? Yes   Systolic Blood Pressure: 99   Diastolic Blood Pressure: 68   Weight: 201   Height: 71   Pulse: 81   Temperature: 96.5   Respiration rate:    Pulse Oxygen:    Please attach any relevant images or files          In [place] via video conference.     63 y/o man with a 3 day history of sore throat, cough, congestion, and runny nose. No fever. He has taken 2 COVID tests and both have been negative (most recently this morning). He is still able to push through and do his activities of walking and working out.     Sore Throat   Associated symptoms include congestion and coughing.       Constitution: Negative for fever.   HENT:  Positive for congestion and sore throat.    Respiratory:  Positive for cough.         Objective:   The physical exam was conducted virtually.  Physical Exam   Constitutional: No distress.   HENT:   Nose: Congestion present.   Pulmonary/Chest: Effort normal. No stridor. No respiratory distress.         Comments: Normal verbal vikash without respiratory compromise.     Neurological: He is alert.     No other pertinent findings on examination.   Assessment:     1. Upper respiratory tract infection, unspecified type        Plan:       Upper respiratory tract infection, unspecified type    With your 2 negative COVID tests, this is most likely a different summer type respiratory virus. I recommend continued symptomatic care with tylenol/ibuprofen as needed, Mucinex for congestion, and sore throat lozenges. If you worsen acutely or develop new fever or other symptoms then go in for an in person visit.

## 2024-07-10 ENCOUNTER — OFFICE VISIT (OUTPATIENT)
Dept: URGENT CARE | Facility: CLINIC | Age: 64
End: 2024-07-10
Payer: MEDICARE

## 2024-07-10 VITALS
SYSTOLIC BLOOD PRESSURE: 114 MMHG | WEIGHT: 209.88 LBS | HEART RATE: 59 BPM | RESPIRATION RATE: 20 BRPM | TEMPERATURE: 98 F | BODY MASS INDEX: 29.38 KG/M2 | HEIGHT: 71 IN | DIASTOLIC BLOOD PRESSURE: 74 MMHG | OXYGEN SATURATION: 96 %

## 2024-07-10 DIAGNOSIS — J02.9 SORE THROAT: Primary | ICD-10-CM

## 2024-07-10 DIAGNOSIS — R09.81 NASAL CONGESTION: ICD-10-CM

## 2024-07-10 LAB
CTP QC/QA: YES
MOLECULAR STREP A: NEGATIVE

## 2024-07-10 PROCEDURE — 87651 STREP A DNA AMP PROBE: CPT | Mod: QW,S$GLB,,

## 2024-07-10 PROCEDURE — 99214 OFFICE O/P EST MOD 30 MIN: CPT | Mod: S$GLB,,,

## 2024-07-10 RX ORDER — PROMETHAZINE HYDROCHLORIDE AND DEXTROMETHORPHAN HYDROBROMIDE 6.25; 15 MG/5ML; MG/5ML
5 SYRUP ORAL NIGHTLY PRN
Qty: 50 ML | Refills: 0 | Status: SHIPPED | OUTPATIENT
Start: 2024-07-10 | End: 2024-07-20

## 2024-07-10 RX ORDER — FLUTICASONE PROPIONATE 50 MCG
2 SPRAY, SUSPENSION (ML) NASAL 2 TIMES DAILY
Qty: 9.9 ML | Refills: 0 | Status: SHIPPED | OUTPATIENT
Start: 2024-07-10 | End: 2024-07-17

## 2024-07-10 NOTE — PATIENT INSTRUCTIONS
Your illness is likely caused by a virus and antibiotics would not be beneficial at this time.    Please drink plenty of fluids and get plenty of rest.    Flonase 2 squirts in each nostril twice daily for at least a week.  This medication takes a 2-3 days to work at full strength.    Take over the counter Sudafed, Mucinex, Allegra, or Benadryl (at night) for symptoms.     May gargle salt water for sore throat, a tablespoon of honey is helpful for cough, especially at night.     If not allergic, please take over the counter Tylenol (Acetaminophen) and/or Motrin (Ibuprofen) as directed for control of pain and/or fever.    Please follow up with your primary care doctor or specialist as needed.    - You must understand that you have received an Urgent Care treatment only and that you may be released before all of your medical problems are known or treated.   - You, the patient, will arrange for follow up care as instructed.   - If your condition worsens or fails to improve we recommend that you receive another evaluation at the ER immediately or contact your PCP to discuss your concerns or return here.   - Follow up with your PCP or specialty clinic as directed in the next 1-2 weeks if not improved or as needed.  You can call [phone] to schedule an appointment with the appropriate provider.      If your symptoms do not improve or worsen, go to the emergency room immediately.

## 2024-07-10 NOTE — PROGRESS NOTES
"Subjective:      Patient ID: Fabian Ivy is a 64 y.o. male.    Vitals:  height is 5' 11" (1.803 m) and weight is 95.2 kg (209 lb 14.1 oz). His oral temperature is 98.1 °F (36.7 °C). His blood pressure is 114/74 and his pulse is 59 (abnormal). His respiration is 20 and oxygen saturation is 96%.     Chief Complaint: Sore Throat    65 yo male c/o sore throat. Pt states his throat started bothering him about 4 days ago, he states he has very mild congestion.  States throat pain is relieved with Muciniex and lozenges.  Denies any fever, SOB, chest pain, GI symptoms, or other concerns.       Sore Throat   This is a new problem. The current episode started in the past 7 days. The problem has been unchanged. Neither side of throat is experiencing more pain than the other. There has been no fever. The pain is at a severity of 5/10. The pain is mild. Pertinent negatives include no abdominal pain, congestion, coughing, diarrhea, drooling, ear discharge, ear pain, headaches, hoarse voice, plugged ear sensation, neck pain, shortness of breath, stridor, swollen glands or vomiting. The treatment provided no relief.       Constitution: Negative for fever and generalized weakness.   HENT:  Positive for sore throat. Negative for ear pain, ear discharge, drooling, congestion and sinus pain.    Neck: Negative for neck pain.   Cardiovascular:  Negative for chest pain.   Respiratory:  Negative for cough, shortness of breath and stridor.    Gastrointestinal:  Negative for abdominal pain, vomiting and diarrhea.   Neurological:  Negative for headaches.      Objective:     Physical Exam   Constitutional: He is oriented to person, place, and time. He appears well-developed. He is cooperative.  Non-toxic appearance. He does not appear ill. No distress.      Comments:Alert, active, well-appearing     HENT:   Head: Normocephalic and atraumatic.   Ears:   Right Ear: Hearing, tympanic membrane, external ear and ear canal normal.   Left Ear: " Hearing, tympanic membrane, external ear and ear canal normal.   Nose: Nose normal. No mucosal edema, rhinorrhea or nasal deformity. No epistaxis. Right sinus exhibits no maxillary sinus tenderness and no frontal sinus tenderness. Left sinus exhibits no maxillary sinus tenderness and no frontal sinus tenderness.   Mouth/Throat: Uvula is midline, oropharynx is clear and moist and mucous membranes are normal. No trismus in the jaw. Normal dentition. No uvula swelling. Cobblestoning present. No oropharyngeal exudate, posterior oropharyngeal edema or posterior oropharyngeal erythema. No tonsillar exudate.   Eyes: Conjunctivae and lids are normal. No scleral icterus.   Neck: Trachea normal and phonation normal. Neck supple. No edema present. No erythema present. No neck rigidity present.   Cardiovascular: Normal rate, regular rhythm, normal heart sounds and normal pulses.   Pulmonary/Chest: Effort normal and breath sounds normal. No respiratory distress. He has no decreased breath sounds. He has no rhonchi.   Clear bilaterally         Comments: Clear bilaterally    Abdominal: Normal appearance.   Musculoskeletal: Normal range of motion.         General: No deformity. Normal range of motion.   Neurological: He is alert and oriented to person, place, and time. He exhibits normal muscle tone. Coordination normal.   Skin: Skin is warm, dry, intact, not diaphoretic and not pale.   Psychiatric: His speech is normal and behavior is normal. Judgment and thought content normal.   Nursing note and vitals reviewed.      Assessment:     1. Sore throat        Plan:     Patient is very well-appearing, alert and active, VSS, afebrile without recent antipyretic, and appears well-hydrated.  Physical exam as documented above, no clinical evidence of respiratory failure, dehydration, or focal/systemic bacterial infection at this time. Anticipatory guidance regarding viral URI's discussed with patient.  Strep testing negative. Low suspicion  of bacterial sinusitis at this time based on history and physical exam.  Discussed use of over-the-counter medications, such as Sudafed and Mucinez-D, for symptoms as well as supportive care and return precautions. Patient verbalizes understanding and agrees to follow-up with PCP as needed.     Results for orders placed or performed in visit on 07/10/24   POCT Strep A, Molecular   Result Value Ref Range    Molecular Strep A, POC Negative Negative     Acceptable Yes        Sore throat  -     POCT Strep A, Molecular    Other orders  -     promethazine-dextromethorphan (PROMETHAZINE-DM) 6.25-15 mg/5 mL Syrp; Take 5 mLs by mouth nightly as needed.  Dispense: 50 mL; Refill: 0  -     fluticasone propionate (FLONASE) 50 mcg/actuation nasal spray; 2 sprays (100 mcg total) by Each Nostril route 2 (two) times a day. for 7 days  Dispense: 9.9 mL; Refill: 0      Patient Instructions   Your illness is likely caused by a virus and antibiotics would not be beneficial at this time.    Please drink plenty of fluids and get plenty of rest.    Flonase 2 squirts in each nostril twice daily for at least a week.  This medication takes a 2-3 days to work at full strength.    Take over the counter Sudafed, Mucinex, Allegra, or Benadryl (at night) for symptoms.     May gargle salt water for sore throat, a tablespoon of honey is helpful for cough, especially at night.     If not allergic, please take over the counter Tylenol (Acetaminophen) and/or Motrin (Ibuprofen) as directed for control of pain and/or fever.    Please follow up with your primary care doctor or specialist as needed.    - You must understand that you have received an Urgent Care treatment only and that you may be released before all of your medical problems are known or treated.   - You, the patient, will arrange for follow up care as instructed.   - If your condition worsens or fails to improve we recommend that you receive another evaluation at the ER  immediately or contact your PCP to discuss your concerns or return here.   - Follow up with your PCP or specialty clinic as directed in the next 1-2 weeks if not improved or as needed.  You can call [phone] to schedule an appointment with the appropriate provider.      If your symptoms do not improve or worsen, go to the emergency room immediately.

## 2024-09-12 ENCOUNTER — TELEPHONE (OUTPATIENT)
Dept: INTERNAL MEDICINE | Facility: CLINIC | Age: 64
End: 2024-09-12
Payer: MEDICARE

## 2024-09-12 NOTE — TELEPHONE ENCOUNTER
Spoke with patient. Dashawn. appointment with Dr. Rice on 09/13/24. Patient expressed understanding and gratitude for phone call.  Call ended.

## 2024-09-13 ENCOUNTER — OFFICE VISIT (OUTPATIENT)
Dept: INTERNAL MEDICINE | Facility: CLINIC | Age: 64
End: 2024-09-13
Attending: INTERNAL MEDICINE
Payer: MEDICARE

## 2024-09-13 ENCOUNTER — LAB VISIT (OUTPATIENT)
Dept: LAB | Facility: OTHER | Age: 64
End: 2024-09-13
Attending: INTERNAL MEDICINE
Payer: MEDICARE

## 2024-09-13 VITALS
HEART RATE: 63 BPM | BODY MASS INDEX: 28.26 KG/M2 | OXYGEN SATURATION: 98 % | WEIGHT: 202.63 LBS | DIASTOLIC BLOOD PRESSURE: 74 MMHG | SYSTOLIC BLOOD PRESSURE: 120 MMHG

## 2024-09-13 DIAGNOSIS — R73.03 PRE-DIABETES: ICD-10-CM

## 2024-09-13 DIAGNOSIS — E78.2 MIXED HYPERLIPIDEMIA: ICD-10-CM

## 2024-09-13 DIAGNOSIS — G47.33 OSA (OBSTRUCTIVE SLEEP APNEA): ICD-10-CM

## 2024-09-13 DIAGNOSIS — I10 BENIGN ESSENTIAL HYPERTENSION: Primary | ICD-10-CM

## 2024-09-13 LAB
ALBUMIN SERPL BCP-MCNC: 4.1 G/DL (ref 3.5–5.2)
ALP SERPL-CCNC: 60 U/L (ref 55–135)
ALT SERPL W/O P-5'-P-CCNC: 25 U/L (ref 10–44)
ANION GAP SERPL CALC-SCNC: 9 MMOL/L (ref 8–16)
AST SERPL-CCNC: 23 U/L (ref 10–40)
BILIRUB SERPL-MCNC: 0.7 MG/DL (ref 0.1–1)
BUN SERPL-MCNC: 16 MG/DL (ref 8–23)
CALCIUM SERPL-MCNC: 9.6 MG/DL (ref 8.7–10.5)
CHLORIDE SERPL-SCNC: 105 MMOL/L (ref 95–110)
CO2 SERPL-SCNC: 25 MMOL/L (ref 23–29)
CREAT SERPL-MCNC: 1 MG/DL (ref 0.5–1.4)
EST. GFR  (NO RACE VARIABLE): >60 ML/MIN/1.73 M^2
GLUCOSE SERPL-MCNC: 102 MG/DL (ref 70–110)
POTASSIUM SERPL-SCNC: 4.7 MMOL/L (ref 3.5–5.1)
PROT SERPL-MCNC: 7.3 G/DL (ref 6–8.4)
SODIUM SERPL-SCNC: 139 MMOL/L (ref 136–145)

## 2024-09-13 PROCEDURE — 83036 HEMOGLOBIN GLYCOSYLATED A1C: CPT | Performed by: INTERNAL MEDICINE

## 2024-09-13 PROCEDURE — 80053 COMPREHEN METABOLIC PANEL: CPT | Performed by: INTERNAL MEDICINE

## 2024-09-13 PROCEDURE — 99213 OFFICE O/P EST LOW 20 MIN: CPT | Mod: PBBFAC | Performed by: INTERNAL MEDICINE

## 2024-09-13 PROCEDURE — 99999 PR PBB SHADOW E&M-EST. PATIENT-LVL III: CPT | Mod: PBBFAC,,, | Performed by: INTERNAL MEDICINE

## 2024-09-13 RX ORDER — VALSARTAN 40 MG/1
40 TABLET ORAL DAILY
Qty: 90 TABLET | Refills: 3 | Status: SHIPPED | OUTPATIENT
Start: 2024-09-13 | End: 2025-09-13

## 2024-09-13 NOTE — PROGRESS NOTES
Subjective:       Patient ID: Fabian Ivy is a 64 y.o. male.    Chief Complaint: Hypertension (6m F/u)    Here for 6 month f/u    obesity BMI 31.7, HTN, HLD, TORY, preDM    BP improved with 30 lbs weight loss. Utilizing compounded semaglutide Taking 1/4 tablet of BP med. Reports 40mg but MAR has 80mg. He will check when he gets home. Not tx TORY.    ### HTN ###  Valsartan 40mg      ### HLD ###  Lipitor 40mg    ### TORY ###  Not Tx    ### Lumbar DJD  meloxicam            Review of Systems   Constitutional:  Negative for appetite change, chills, fever and unexpected weight change.   HENT:  Negative for hearing loss, sore throat and trouble swallowing.    Eyes:  Negative for visual disturbance.   Respiratory:  Negative for cough, chest tightness and shortness of breath.    Cardiovascular:  Negative for chest pain and leg swelling.   Gastrointestinal:  Negative for abdominal pain, blood in stool, constipation, diarrhea, nausea and vomiting.   Endocrine: Negative for polydipsia and polyuria.   Genitourinary:  Negative for decreased urine volume, difficulty urinating, dysuria, frequency and urgency.   Musculoskeletal:  Negative for gait problem.   Skin:  Negative for rash.   Neurological:  Negative for dizziness and numbness.   Psychiatric/Behavioral:  The patient is not nervous/anxious.        Objective:      Vitals:    09/13/24 1058 09/13/24 1116   BP: (!) 140/82 120/74   BP Location: Right arm    Patient Position: Sitting    BP Method: Medium (Manual)    Pulse: 63    SpO2: 98%    Weight: 91.9 kg (202 lb 9.6 oz)       Physical Exam  Vitals and nursing note reviewed.   Constitutional:       General: He is not in acute distress.     Appearance: Normal appearance. He is well-developed.   HENT:      Head: Normocephalic and atraumatic.      Mouth/Throat:      Pharynx: No oropharyngeal exudate.   Eyes:      General: No scleral icterus.     Conjunctiva/sclera: Conjunctivae normal.      Pupils: Pupils are equal, round, and  reactive to light.   Neck:      Thyroid: No thyromegaly.   Cardiovascular:      Rate and Rhythm: Normal rate and regular rhythm.      Heart sounds: Normal heart sounds. No murmur heard.  Pulmonary:      Effort: Pulmonary effort is normal.      Breath sounds: Normal breath sounds. No wheezing or rales.   Abdominal:      General: There is no distension.   Musculoskeletal:         General: No tenderness.   Lymphadenopathy:      Cervical: No cervical adenopathy.   Skin:     General: Skin is warm and dry.   Neurological:      Mental Status: He is alert and oriented to person, place, and time.   Psychiatric:         Behavior: Behavior normal.         Assessment:       1. Benign essential hypertension    2. Pre-diabetes    3. Mixed hyperlipidemia    4. TORY (obstructive sleep apnea)        Plan:       Fabian was seen today for hypertension.    Diagnoses and all orders for this visit:    Benign essential hypertension   -     valsartan (DIOVAN) 40 MG tablet; Take 1 tablet (40 mg total) by mouth once daily.  Pre-diabetes  -     Hemoglobin A1C; Future    Mixed hyperlipidemia  -     Comprehensive Metabolic Panel; Future      Visit today is associated with current or anticipated ongoing medical care related to this patient's single serious condition/complex condition of HTN, HLD, preDM. The patient will return to see me as these issues will be followed longitudinally.      RTC in 6 months or sooner rianna Singh MD  Internal Medicine-[facility]        Side effects of medication(s) were discussed in detail and patient voiced understanding.  Patient will call back for any issues or complications.

## 2024-09-14 LAB
ESTIMATED AVG GLUCOSE: 120 MG/DL (ref 68–131)
HBA1C MFR BLD: 5.8 % (ref 4–5.6)

## 2024-09-24 RX ORDER — MELOXICAM 15 MG/1
TABLET ORAL
Qty: 30 TABLET | Refills: 1 | Status: SHIPPED | OUTPATIENT
Start: 2024-09-24

## 2024-09-24 NOTE — PROGRESS NOTES
Subjective:   Patient ID: Fabian Ivy is a 64 y.o. male.    Chief Complaint: Pain of the Right Hand    HPI  Fabian Ivy is a 64 y.o. male returns to the clinic today for re-evaluation of left thumb triggering.    Pt reports complete relief following CSI to left thumb 4 months ago.    Today, he reports his main concern is pain and swelling to the right hand middle digit MCP joint.  As pain has been going on for multiple years.  He attributes it to known hand arthritis.  He denies any digit triggering or locking.  Denies sensation changes and/or recent injury.    Review of patient's allergies indicates:  No Known Allergies      Current Outpatient Medications   Medication Sig Dispense Refill    atorvastatin (LIPITOR) 40 MG tablet TAKE 1 TABLET(40 MG) BY MOUTH DAILY 90 tablet 3    chlorhexidine (PERIDEX) 0.12 % solution       cyclobenzaprine (FLEXERIL) 10 MG tablet Take 10 mg by mouth 3 (three) times daily as needed.      HYDROcodone-acetaminophen (NORCO)  mg per tablet Take 1 tablet by mouth every 12 (twelve) hours as needed.      meloxicam (MOBIC) 15 MG tablet TAKE 1 TABLET(15 MG) BY MOUTH DAILY 30 tablet 1    PFIZER COVID-19 BUCK VACCN,PF, 30 mcg/0.3 mL injection       pneumoc 20-shani conj-dip cr,PF, (PREVNAR-20, PF,) 0.5 mL Syrg injection Inject into the muscle. 0.5 mL 0    valsartan (DIOVAN) 40 MG tablet Take 1 tablet (40 mg total) by mouth once daily. 90 tablet 3    diclofenac sodium (VOLTAREN) 1 % Gel Apply 2 g topically 2 (two) times daily. (Patient not taking: Reported on 7/10/2024) 450 g 2     No current facility-administered medications for this visit.       Past Medical History:   Diagnosis Date    Hepatitis C     s/p harvone    Hypertension        History reviewed. No pertinent surgical history.    Review of Systems:  Review of Systems   Constitutional:  Negative for chills and fever.   Respiratory:  Negative for shortness of breath.    Cardiovascular:  Negative for chest pain and leg swelling.  "  Gastrointestinal:  Negative for nausea and vomiting.   Genitourinary:  Negative for dysuria.   Musculoskeletal:  Positive for joint pain. Negative for falls.   Skin:  Negative for rash.   Neurological:  Negative for dizziness and sensory change.       OBJECTIVE:     PHYSICAL EXAM:  Height: 5' 11" (180.3 cm) Weight: 92 kg (202 lb 13.2 oz)  Vitals:    09/25/24 1400   Weight: 92 kg (202 lb 13.2 oz)   Height: 5' 11" (1.803 m)   PainSc:   6     General  General: alert & oriented x 3, NAD, sitting comfortably in the exam room  Resp: breathing unlabored  GI: abdomen soft and nondistended  Psych: mood and affect appropriate  HEENT: PERRLA    Examination RIGHT HAND/THUMB:   Skin: No evidence of lacerations, lesions, open wounds or visible deformity.  Swelling: (+) moderate swelling to left hand middle digit MCP.  No evidence of infection present.  Palpation:   (+) TTP to MCP right middle digit  Otherwise, NTTP to bony prominences and soft tissues throughout.   ROM (active and passive):  No palpable triggering  Patient unable to fully flex middle digit MCP secondary to swelling and pain.  He notes that this is his baseline level of movement to this digit  Sensation: grossly intact to radial/median/ulnar nerve distributions  Special Testing: Tinels negative. Finkelstein negative. Thumb CMC grind (negative)  NV: Pulses palpable. Cap refill brisk    Imaging:  No new imaging performed today    ASSESSMENT/PLAN:     1. Hand arthritis        2. Trigger finger of left thumb          Patient's pain and swelling to the right hand middle digit MCP is likely secondary to an arthritis flare-up within the joint.    We reviewed conservative treatment for hand arthritis including warm water soaks, anti-inflammatories, activity modification and appropriate usage of CSI.    Local corticosteroid was recommended to right hand middle digit MCP joint for pain and inflammation relief.  CSI performed.  Standard precautions provided    Follow Up:  " 3 months for re-evaluation

## 2024-09-25 ENCOUNTER — OFFICE VISIT (OUTPATIENT)
Dept: ORTHOPEDICS | Facility: CLINIC | Age: 64
End: 2024-09-25
Payer: MEDICARE

## 2024-09-25 VITALS — WEIGHT: 202.81 LBS | HEIGHT: 71 IN | BODY MASS INDEX: 28.39 KG/M2

## 2024-09-25 DIAGNOSIS — M19.049 HAND ARTHRITIS: Primary | ICD-10-CM

## 2024-09-25 DIAGNOSIS — M65.312 TRIGGER FINGER OF LEFT THUMB: ICD-10-CM

## 2024-09-25 PROCEDURE — 20600 DRAIN/INJ JOINT/BURSA W/O US: CPT | Mod: S$PBB,RT,, | Performed by: PHYSICIAN ASSISTANT

## 2024-09-25 PROCEDURE — 99213 OFFICE O/P EST LOW 20 MIN: CPT | Mod: 25,S$PBB,, | Performed by: PHYSICIAN ASSISTANT

## 2024-09-25 PROCEDURE — 99999 PR PBB SHADOW E&M-EST. PATIENT-LVL III: CPT | Mod: PBBFAC,,, | Performed by: PHYSICIAN ASSISTANT

## 2024-09-25 PROCEDURE — 20600 DRAIN/INJ JOINT/BURSA W/O US: CPT | Mod: PBBFAC,PN,RT | Performed by: PHYSICIAN ASSISTANT

## 2024-09-25 PROCEDURE — 99213 OFFICE O/P EST LOW 20 MIN: CPT | Mod: PBBFAC,PN | Performed by: PHYSICIAN ASSISTANT

## 2024-09-25 PROCEDURE — 99999PBSHW PR PBB SHADOW TECHNICAL ONLY FILED TO HB: Mod: PBBFAC,,,

## 2024-09-25 RX ORDER — TRIAMCINOLONE ACETONIDE 40 MG/ML
20 INJECTION, SUSPENSION INTRA-ARTICULAR; INTRAMUSCULAR
Status: DISCONTINUED | OUTPATIENT
Start: 2024-09-25 | End: 2024-09-25 | Stop reason: HOSPADM

## 2024-09-25 RX ADMIN — TRIAMCINOLONE ACETONIDE 20 MG: 40 INJECTION, SUSPENSION INTRA-ARTICULAR; INTRAMUSCULAR at 02:09

## 2024-09-25 NOTE — PROCEDURES
Small Joint Aspiration/Injection: R long MCP    Date/Time: 9/25/2024 2:00 PM    Performed by: Laurence Razo PA-C  Authorized by: Laurence Razo PA-C    Consent Done?:  Yes (Verbal)  Indications:  Arthritis, pain and joint swelling  Site marked: the procedure site was marked    Timeout: prior to procedure the correct patient, procedure, and site was verified    Location:  Long finger  Site:  R long MCP  Ultrasonic guidance for needle placement?: No    Needle size:  25 G  Approach:  Dorsal  Medications:  20 mg triamcinolone acetonide 40 mg/mL  Patient tolerance:  Patient tolerated the procedure well with no immediate complications    PROCEDURE NOTE:  I have explained the risks, benefits, and alternatives of the procedure in detail.  The patient voices understanding and all questions have been answered.  The patient agrees to proceed as planned, consents to injection. Pause for timeout. After a sterile prep of the skin performed in the normal fashion, the right long digit MCP joint is injected from the dorsal approach using a 25 gauge needle with a combination of 0.5cc 1% lidocaine and 20 mg of kenalog. The patient is cautioned and immediate relief of pain is secondary to the local anesthetic and will be temporary.  After the anesthetic wears off there may be a increase in pain that may last for a few hours or a few days and they should use ice to help alleviate this flair up of pain.

## 2024-12-25 ENCOUNTER — PATIENT MESSAGE (OUTPATIENT)
Dept: INTERNAL MEDICINE | Facility: CLINIC | Age: 64
End: 2024-12-25
Payer: MEDICARE

## 2025-01-13 ENCOUNTER — TELEPHONE (OUTPATIENT)
Dept: INTERNAL MEDICINE | Facility: CLINIC | Age: 65
End: 2025-01-13
Payer: MEDICARE

## 2025-01-13 ENCOUNTER — PATIENT MESSAGE (OUTPATIENT)
Dept: INTERNAL MEDICINE | Facility: CLINIC | Age: 65
End: 2025-01-13

## 2025-01-13 ENCOUNTER — OFFICE VISIT (OUTPATIENT)
Dept: INTERNAL MEDICINE | Facility: CLINIC | Age: 65
End: 2025-01-13
Attending: INTERNAL MEDICINE
Payer: MEDICARE

## 2025-01-13 VITALS — DIASTOLIC BLOOD PRESSURE: 100 MMHG | SYSTOLIC BLOOD PRESSURE: 159 MMHG | HEART RATE: 63 BPM

## 2025-01-13 DIAGNOSIS — M46.1 SACROILIITIS, NOT ELSEWHERE CLASSIFIED: Primary | ICD-10-CM

## 2025-01-13 DIAGNOSIS — G47.33 OSA (OBSTRUCTIVE SLEEP APNEA): ICD-10-CM

## 2025-01-13 DIAGNOSIS — I10 BENIGN ESSENTIAL HYPERTENSION: ICD-10-CM

## 2025-01-13 DIAGNOSIS — E66.3 OVERWEIGHT (BMI 25.0-29.9): ICD-10-CM

## 2025-01-13 DIAGNOSIS — R73.03 PRE-DIABETES: ICD-10-CM

## 2025-01-13 RX ORDER — TIZANIDINE 4 MG/1
4-8 TABLET ORAL DAILY PRN
COMMUNITY
Start: 2025-01-03

## 2025-01-13 RX ORDER — TIRZEPATIDE 5 MG/.5ML
5 INJECTION, SOLUTION SUBCUTANEOUS
Qty: 4 PEN | Refills: 0 | Status: SHIPPED | OUTPATIENT
Start: 2025-01-14
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
## 2025-01-17 ENCOUNTER — TELEPHONE (OUTPATIENT)
Dept: INTERNAL MEDICINE | Facility: CLINIC | Age: 65
End: 2025-01-17
Payer: MEDICARE

## 2025-01-17 NOTE — TELEPHONE ENCOUNTER
"Pt insurance denied Zepbound med.      "The information received from your prescriber does not support approval of this drug under your medicare part D benefit b/c DX provided is not covered under part D benefit. Your plan formulary's has a restriction on this drug. Restrictions has been approved by the centers for medicare and medicaid services ( CMS). Coverage of the requested medication is provided under medicare part D when the FDA has approved use of the  requested mediation diagnosis provided by prescriber. FDA approved use is for calorie diet and increase physical activity for moderate to sever TORY in adults with obesity. Not covered under Part D is use Is for weight loss. "  "

## 2025-03-05 ENCOUNTER — TELEPHONE (OUTPATIENT)
Dept: ADMINISTRATIVE | Facility: HOSPITAL | Age: 65
End: 2025-03-05
Payer: MEDICARE

## 2025-03-05 VITALS — DIASTOLIC BLOOD PRESSURE: 75 MMHG | SYSTOLIC BLOOD PRESSURE: 117 MMHG

## 2025-04-28 DIAGNOSIS — Z00.00 ENCOUNTER FOR MEDICARE ANNUAL WELLNESS EXAM: ICD-10-CM

## 2025-04-30 ENCOUNTER — ON-DEMAND VIRTUAL (OUTPATIENT)
Dept: URGENT CARE | Facility: CLINIC | Age: 65
End: 2025-04-30
Payer: MEDICARE

## 2025-04-30 DIAGNOSIS — M62.838 MUSCLE SPASMS OF NECK: ICD-10-CM

## 2025-04-30 DIAGNOSIS — U07.1 COVID-19: Primary | ICD-10-CM

## 2025-04-30 RX ORDER — METHOCARBAMOL 750 MG/1
750 TABLET, FILM COATED ORAL 4 TIMES DAILY
Qty: 40 TABLET | Refills: 0 | Status: SHIPPED | OUTPATIENT
Start: 2025-04-30 | End: 2025-05-10

## 2025-05-01 NOTE — PROGRESS NOTES
Subjective:      Patient ID: Fabian Ivy is a 65 y.o. male.    Vitals:  vitals were not taken for this visit.     Chief Complaint: COVID-19 Concerns      Visit Type: TELE AUDIOVISUAL    Past Medical History:   Diagnosis Date    Hepatitis C     s/p harvone    Hypertension      No past surgical history on file.  Review of patient's allergies indicates:  No Known Allergies  Medications Ordered Prior to Encounter[1]  Family History   Problem Relation Name Age of Onset    Cancer Father          lung       Medications Ordered                [id number] - [address] AT SEC OF [place] [facility]   [address] [facility] [address]    Telephone: [phone]   Fax: [phone]   Hours: Not open 24 hours                         E-Prescribed (2 of 2)              methocarbamoL (ROBAXIN) 750 MG Tab    Sig: Take 1 tablet (750 mg total) by mouth 4 (four) times daily. for 10 days       Start: 4/30/25     Quantity: 40 tablet Refills: 0                         nirmatrelvir-ritonavir 300 mg (150 mg x 2)-100 mg copackaged tablets (EUA)    Sig: Take 3 tablets by mouth 2 (two) times daily for 5 days. Each dose contains 2 nirmatrelvir (pink tablets) and 1 ritonavir (white tablet). Take all 3 tablets together       Start: 4/30/25     Quantity: 30 tablet Refills: 0                           Ohs Peq Odvv Intake    4/30/2025  8:19 PM CDT - Filed by Patient   What is your current physical address in the event of a medical emergency? [address] [place] [id number]   Are you able to take your vital signs? Yes   Systolic Blood Pressure: 126   Diastolic Blood Pressure: 72   Weight: 185   Height: 71   Pulse: 60   Temperature:    Respiration rate:    Pulse Oxygen:    Please attach any relevant images or files    Is your employer contracted with [facility]? No         Presents with c/o headache, sore throat, muscle spasms in his neck and body aches that began 2 days ago and have become worse.   Took home covid test 2 hours ago and was positive.    Two patient identifiers were used-name was repeated verbally as well as date of birth.  The patient was located in their home in the state of [place].          Constitution: Positive for fatigue.   HENT:  Positive for sore throat.    Gastrointestinal:  Positive for diarrhea.   Neurological:  Positive for headaches.        Objective:   The physical exam was conducted virtually.  Physical Exam   Constitutional: He is oriented to person, place, and time. No distress.      Comments:uncomfortable     HENT:   Head: Normocephalic and atraumatic.   Neck:      Comments: Having spasms to the left side of his neck   Pulmonary/Chest: Effort normal. No respiratory distress.   Neurological: no focal deficit. He is alert and oriented to person, place, and time.   Skin: Skin is not pale.   Psychiatric: His behavior is normal. Mood, judgment and thought content normal.       Assessment:     1. COVID-19    2. Muscle spasms of neck        Plan:       COVID-19    Muscle spasms of neck    Other orders  -     nirmatrelvir-ritonavir 300 mg (150 mg x 2)-100 mg copackaged tablets (EUA); Take 3 tablets by mouth 2 (two) times daily for 5 days. Each dose contains 2 nirmatrelvir (pink tablets) and 1 ritonavir (white tablet). Take all 3 tablets together  Dispense: 30 tablet; Refill: 0  -     methocarbamoL (ROBAXIN) 750 MG Tab; Take 1 tablet (750 mg total) by mouth 4 (four) times daily. for 10 days  Dispense: 40 tablet; Refill: 0        The CDC has recently changed guideline to include patient that are Covid-19 positive should stay home until theyve been fever-free without medication for at least 24 hours and their symptoms have been improving for 24 hours.      Important home care recommendations include: monitor your symptoms, if you have trouble breathing please go to the ER. Stay in a separate room from other household members, if possible.  Use a separate bathroom, if possible.  Avoid  contact with other members of the household and pets.  Don't share personal household items, like cups, towels, and utensils.  Wear a mask when around other people if able.  Please refer to CDC's websit for more information about what to do if you you're sick and how to notify your contacts.    Stay home except to get medical care. Most people with COVID-19 have mild illness and can recover at home without medical care. Do not leave your home, except to get medical care. Do not visit public areas. Get rest and stay hydrated. Call before you get medical care. Be sure to get care if you have trouble breathing, or have any other emergency warning signs, or if you think it is an emergency.  Avoid public transportation, ride-sharing, or taxis.  Separate yourself from other people.  As much as possible, stay in a specific room and away from other people and pets in your home. If possible, you should use a separate bathroom. If you need to be around other people or animals in or outside of the home, wear a mask.     If someone is showing any of these signs, seek emergency medical care immediately:  Trouble breathing  Persistent pain or pressure in the chest  New confusion  Inability to wake or stay awake  Pale, gray, or blue-colored skin, lips, or nail beds, depending on skin tone  *This list is not all possible symptoms. Please call your medical provider for any other symptoms that are severe or concerning to you.    Call ahead before visiting your doctor  Call ahead. Many medical visits for routine care are being postponed or done by phone or telemedicine.  If you have a medical appointment that cannot be postponed, call your doctors office, and tell them you have or may have COVID-19. This will help the office protect themselves and other patients.    You dont need to wear the mask if you are alone. If you cant put on a mask (because of trouble breathing, for example), cover your coughs and sneezes in some other way.  Try to stay at least 6 feet away from other people. This will help protect the people around you.  Masks should not be placed on young children under age 2 years, anyone who has trouble breathing, or anyone who is not able to remove the mask without help.    Cover your coughs and sneezes  Cover your mouth and nose with a tissue when you cough or sneeze.  Throw away used tissues in a lined trash can.  Immediately wash your hands with soap and water for at least 20 seconds. If soap and water are not available, clean your hands with an alcohol-based hand  that contains at least 60% alcohol.  hands wash light icon  Clean your hands often  Wash your hands often with soap and water for at least 20 seconds. This is especially important after blowing your nose, coughing, or sneezing; going to the bathroom; and before eating or preparing food.  Use hand  if soap and water are not available. Use an alcohol-based hand  with at least 60% alcohol, covering all surfaces of your hands and rubbing them together until they feel dry.  Soap and water are the best option, especially if hands are visibly dirty.  Avoid touching your eyes, nose, and mouth with unwashed hands.  Handwashing Tips  Avoid sharing personal household items  Do not share dishes, drinking glasses, cups, eating utensils, towels, or bedding with other people in your home.  Wash these items thoroughly after using them with soap and water or put in the .  spraybottle icon  Clean all high-touch surfaces everyday  Clean and disinfect high-touch surfaces in your sick room and bathroom; wear disposable gloves. Let someone else clean and disinfect surfaces in common areas, but you should clean your bedroom and bathroom, if possible.  If a caregiver or other person needs to clean and disinfect a sick persons bedroom or bathroom, they should do so on an as-needed basis. The caregiver/other person should wear a mask and disposable  gloves prior to cleaning. They should wait as long as possible after the person who is sick has used the bathroom before coming in to clean and use the bathroom.  High-touch surfaces include phones, remote controls, counters, tabletops, doorknobs, bathroom fixtures, toilets, keyboards, tablets, and bedside tables.    Clean and disinfect areas that may have blood, stool, or body fluids on them.  Use household  and disinfectants. Clean the area or item with soap and water or another detergent if it is dirty. Then, use a household disinfectant.  Be sure to follow the instructions on the label to ensure safe and effective use of the product. Many products recommend keeping the surface wet for several minutes to ensure germs are killed. Many also recommend precautions such as wearing gloves and making sure you have good ventilation during use of the product.  Use a product from EPAs List N: Disinfectants for Coronavirus (COVID-19)external icon.  Complete Disinfection Guidance      PLEASE READ YOUR DISCHARGE INSTRUCTIONS ENTIRELY AS IT CONTAINS IMPORTANT INFORMATION.    Please drink plenty of fluids.    Please get plenty of rest.    If not allergic, please take over the counter Tylenol (Acetaminophen) and/or Motrin (Ibuprofen) as directed for control of muscle aches, pain, and/or fever.    Please go to the Emergency Department for any shortness of breath or difficulty breathing.    For congestion, runny nose, or post nasal drip please take an over the counter antihistamine medication (Claritin/Zyrtec/Allegra) of your choice as directed or try an over the counter decongestant like Sudafed. You buy this behind the pharmacy counter.  You can also buy combination OTC antihistamine plus decongestant medications such at Zyrtec-D or Claritin-D.  Do not take decongestant if you suffer from high blood pressure.    For cough, you may be prescribed medication.  Take as prescribed.  If you were not prescribed any medication,  you can use over the counter cough medications such as Robitussin.  If you have chest congestion you can consider using over the counter Mucinex but make sure you drink plenty of water to encourage mobilization of the secretions.    If you do have high blood pressure or palpitations, it is safe to take Coricidin HBP for relief of sinus symptoms.    Sore throat recommendations: Warm fluids (tea with honey, soup, broth), warm salt water gargles, throat lozenges, rest, hydration.    If you  smoke, please stop smoking.    You must understand that you've received an Urgent Care treatment only and that you may be released before all of your medical problems are known or treated. You, the patient, will arrange for follow up as instructed. If your symptoms worsen or fail to improve you should go to the Emergency Room.    Hold statin while taking Paxlovid            Present with the patient at the time of consultation: TELEMED PRESENT WITH PATIENT: None         [1]   Current Outpatient Medications on File Prior to Visit   Medication Sig Dispense Refill    atorvastatin (LIPITOR) 40 MG tablet TAKE 1 TABLET(40 MG) BY MOUTH DAILY 90 tablet 3    chlorhexidine (PERIDEX) 0.12 % solution       cyclobenzaprine (FLEXERIL) 10 MG tablet Take 10 mg by mouth 3 (three) times daily as needed.      diclofenac sodium (VOLTAREN) 1 % Gel Apply 2 g topically 2 (two) times daily. 450 g 2    HYDROcodone-acetaminophen (NORCO)  mg per tablet Take 1 tablet by mouth every 12 (twelve) hours as needed.      meloxicam (MOBIC) 15 MG tablet TAKE 1 TABLET(15 MG) BY MOUTH DAILY 30 tablet 1    PFIZER COVID-19 BUCK VACCN,PF, 30 mcg/0.3 mL injection  (Patient not taking: Reported on 1/13/2025)      pneumoc 20-shani conj-dip cr,PF, (PREVNAR-20, PF,) 0.5 mL Syrg injection Inject into the muscle. (Patient not taking: Reported on 1/13/2025) 0.5 mL 0    tirzepatide, weight loss, (ZEPBOUND) 5 mg/0.5 mL PnIj Inject 5 mg into the skin every 7 days. 4 Pen 0     tiZANidine (ZANAFLEX) 4 MG tablet Take 4-8 mg by mouth daily as needed.      valsartan (DIOVAN) 40 MG tablet Take 1 tablet (40 mg total) by mouth once daily. 90 tablet 3     No current facility-administered medications on file prior to visit.

## 2025-05-15 DIAGNOSIS — E78.2 MIXED HYPERLIPIDEMIA: ICD-10-CM

## 2025-05-15 RX ORDER — ATORVASTATIN CALCIUM 40 MG/1
40 TABLET, FILM COATED ORAL DAILY
Qty: 90 TABLET | Refills: 3 | Status: SHIPPED | OUTPATIENT
Start: 2025-05-15

## 2025-05-15 NOTE — TELEPHONE ENCOUNTER
Care Due:                  Date            Visit Type   Department     Provider  --------------------------------------------------------------------------------                                ESTABLISHED                              PATIENT -    [facility] INTERNAL  Last Visit: 01-      [facility]      MAYO Rice  Next Visit: None Scheduled  None         None Found                                                            Last  Test          Frequency    Reason                     Performed    Due Date  --------------------------------------------------------------------------------    CBC.........  12 months..  diclofenac...............  03- 03-    HBA1C.......  6 months...  tirzepatide,.............  09- 03-    Lipid Panel.  12 months..  atorvastatin.............  03- 03-    [employer] Embedded Care Due Messages. Reference number: [id number].   5/15/2025 10:27:15 AM CDT

## 2025-05-15 NOTE — TELEPHONE ENCOUNTER
Refill Encounter    PCP Visits: Recent Visits  Date Type Provider Dept   01/13/25 Office Visit Dieter Rice MD [facility] Internal Medicine   09/13/24 Office Visit Dieter Rice MD [facility]   Showing recent visits within past 360 days and meeting all other requirements  Future Appointments  Date Type Provider Dept   11/26/25 Appointment Jalen Baez, NP [facility] Medicine   Showing future appointments within next 720 days and meeting all other requirements      Last 3 Blood Pressure:   BP Readings from Last 3 Encounters:   03/05/25 117/75   01/13/25 (!) 159/100   09/13/24 120/74     Preferred Pharmacy:   [facility][id number] [address] AT SEC OF [place] [address] [address]  Phone: [phone] Fax: [phone]    Requested RX:  Requested Prescriptions     Pending Prescriptions Disp Refills    atorvastatin (LIPITOR) 40 MG tablet 90 tablet 3     Sig: Take 1 tablet (40 mg total) by mouth once daily.      RX Route: Normal

## 2025-07-02 ENCOUNTER — PATIENT MESSAGE (OUTPATIENT)
Dept: ORTHOPEDICS | Facility: CLINIC | Age: 65
End: 2025-07-02
Payer: MEDICARE

## 2025-07-04 ENCOUNTER — PATIENT MESSAGE (OUTPATIENT)
Dept: INTERNAL MEDICINE | Facility: CLINIC | Age: 65
End: 2025-07-04
Payer: MEDICARE

## 2025-07-06 NOTE — TELEPHONE ENCOUNTER
Care Due:                  Date            Visit Type   Department     Provider  --------------------------------------------------------------------------------                                ESTABLISHED                              PATIENT -    [facility] INTERNAL  Last Visit: 01-      [facility]      MAYO Rice  Next Visit: None Scheduled  None         None Found                                                            Last  Test          Frequency    Reason                     Performed    Due Date  --------------------------------------------------------------------------------    CMP.........  12 months..  atorvastatin, diclofenac,   09- 09-                             valsartan................    [employer] Embedded Care Due Messages. Reference number: [id number].   7/06/2025 2:02:01 PM CDT

## 2025-07-07 RX ORDER — VALSARTAN 40 MG/1
40 TABLET ORAL
Qty: 90 TABLET | Refills: 0 | Status: SHIPPED | OUTPATIENT
Start: 2025-07-07

## 2025-07-07 NOTE — TELEPHONE ENCOUNTER
Provider Staff:  Action required for this patient    Requires labs      Please see care gap opportunities below in Care Due Message.    Thanks!  [facility]     Appointments      Date Provider   Last Visit   1/13/2025 Dieter Rice MD   Next Visit   Visit date not found Dieter Rice MD     Refill Decision Note   Fabian Ivy  is requesting a refill authorization.  Brief Assessment and Rationale for Refill:  Approve     Medication Therapy Plan:        Comments:     Note composed:5:09 PM 07/07/2025            "  Problem: General Rehab Plan of Care  Goal: Therapeutic Recreation/Music Therapy Goal  Description: The patient and/or their representative will achieve their patient-specific goals related to the plan of care.  The patient-specific goals include:    Patient will attend and participate in scheduled Therapeutic Recreation and Music Therapy group interventions. The groups will focus on assisting patient to receive knowledge to create a safe environment, elimination of suicide ideation, and elevation of mood through recreational/art or music experiences.      1. Patient will identify personal risk factors associated to suicidal/ negative thoughts and behaviors.    2. Patient will engage in increasing the use of coping skills, problem solving, and emotional regulation.   3. Patient will develop positive communication and cognitive thinking about themselves through positive affirmation.    4. Patient will resort to alternative options related to recreation, art, and or music to substitute suicidal ideation.      Attended full hour of music therapy group, with 5-6 patients present. Intervention focused on improving insight and positive coping. Pt checked in as feeling \"fine.\" She appeared guarded and minimally interacted with peers. She was quiet during song discussion about miscommunication, and spent remainder of group drawing and listening to music.   Outcome: No Change     "

## 2025-07-16 ENCOUNTER — OFFICE VISIT (OUTPATIENT)
Dept: ORTHOPEDICS | Facility: CLINIC | Age: 65
End: 2025-07-16
Payer: MEDICARE

## 2025-07-16 DIAGNOSIS — M19.041 ARTHRITIS OF RIGHT HAND: ICD-10-CM

## 2025-07-16 DIAGNOSIS — M65.312 TRIGGER FINGER OF LEFT THUMB: ICD-10-CM

## 2025-07-16 DIAGNOSIS — M79.641 BILATERAL HAND PAIN: Primary | ICD-10-CM

## 2025-07-16 DIAGNOSIS — M79.642 BILATERAL HAND PAIN: Primary | ICD-10-CM

## 2025-07-16 PROCEDURE — 99999 PR PBB SHADOW E&M-EST. PATIENT-LVL III: CPT | Mod: PBBFAC,,,

## 2025-07-16 PROCEDURE — 20550 NJX 1 TENDON SHEATH/LIGAMENT: CPT | Mod: PBBFAC,PN,LT

## 2025-07-16 PROCEDURE — 99999PBSHW PR PBB SHADOW TECHNICAL ONLY FILED TO HB: Mod: PBBFAC,,,

## 2025-07-16 PROCEDURE — 20600 DRAIN/INJ JOINT/BURSA W/O US: CPT | Mod: PBBFAC,PN,RT

## 2025-07-16 PROCEDURE — 99213 OFFICE O/P EST LOW 20 MIN: CPT | Mod: PBBFAC,PN,25

## 2025-07-16 RX ORDER — TRIAMCINOLONE ACETONIDE 40 MG/ML
20 INJECTION, SUSPENSION INTRA-ARTICULAR; INTRAMUSCULAR
Status: DISCONTINUED | OUTPATIENT
Start: 2025-07-16 | End: 2025-07-16 | Stop reason: HOSPADM

## 2025-07-16 RX ADMIN — TRIAMCINOLONE ACETONIDE 20 MG: 40 INJECTION, SUSPENSION INTRA-ARTICULAR; INTRAMUSCULAR at 09:07

## 2025-07-16 NOTE — PROCEDURES
Small Joint Aspiration/Injection: R long MCP    Date/Time: 7/16/2025 9:00 AM    Performed by: Leslie Garcia PA-C  Authorized by: Leslie Garcia PA-C    Consent Done?:  Yes (Verbal)  Indications:  Arthritis, pain and joint swelling  Site marked: the procedure site was marked    Timeout: prior to procedure the correct patient, procedure, and site was verified    Prep: patient was prepped and draped in usual sterile fashion      Location:  Long finger  Site:  R long MCP  Ultrasonic guidance for needle placement?: No    Needle size:  25 G  Approach:  Dorsal  Medications:  20 mg triamcinolone acetonide 40 mg/mL  Patient tolerance:  Patient tolerated the procedure well with no immediate complications

## 2025-07-16 NOTE — PROGRESS NOTES
"Subjective:   Patient ID: Fbaian Ivy is a 65 y.o. male.    Chief Complaint: Hand Pain    HPI  Patient is a RHD 65 y.o. who presents today for f/u of B hand pain (L trigger thumb, R middle MCP joint OA)  Last saw Laurence Razo PA-C in 9/2024  Given CSI to R middle MCP joint in 9/2024 and L thumb trigger finger in 5/2024  Reports symptoms have returned  Denies new trauma  Denies numbness/tingling  Is interested in repeat CSI for both above mentioned complaints today       Previous hx:  Fabian Ivy is a 65 y.o. male returns to the clinic today for re-evaluation of B hand pain (L trigger thumb, R middle MCP joint OA)  Multiyear hx of B hand pain  Pt reports complete relief following CSI to left thumb and right middle MCP.  Denies hx of trauma  No paresthesias  Has experienced clicking, popping, triggering to L thumb A1 pulley with associated "bump" to flexor tendon      Review of patient's allergies indicates:  No Known Allergies      Current Outpatient Medications   Medication Sig Dispense Refill    atorvastatin (LIPITOR) 40 MG tablet Take 1 tablet (40 mg total) by mouth once daily. 90 tablet 3    chlorhexidine (PERIDEX) 0.12 % solution       cyclobenzaprine (FLEXERIL) 10 MG tablet Take 10 mg by mouth 3 (three) times daily as needed.      HYDROcodone-acetaminophen (NORCO)  mg per tablet Take 1 tablet by mouth every 12 (twelve) hours as needed.      PFIZER COVID-19 BUCK VACCN,PF, 30 mcg/0.3 mL injection       pneumoc 20-shani conj-dip cr,PF, (PREVNAR-20, PF,) 0.5 mL Syrg injection Inject into the muscle. 0.5 mL 0    tirzepatide, weight loss, (ZEPBOUND) 5 mg/0.5 mL PnIj Inject 5 mg into the skin every 7 days. 4 Pen 0    tiZANidine (ZANAFLEX) 4 MG tablet Take 4-8 mg by mouth daily as needed.      valsartan (DIOVAN) 40 MG tablet TAKE 1 TABLET(40 MG) BY MOUTH DAILY 90 tablet 0    diclofenac sodium (VOLTAREN) 1 % Gel Apply 2 g topically 2 (two) times daily. 450 g 2    meloxicam (MOBIC) 15 MG tablet TAKE 1 TABLET(15 " MG) BY MOUTH DAILY (Patient not taking: Reported on 7/16/2025) 30 tablet 1     No current facility-administered medications for this visit.       Past Medical History:   Diagnosis Date    Hepatitis C     s/p harvone    Hypertension        History reviewed. No pertinent surgical history.    Review of Systems:  Review of Systems   Constitutional:  Negative for chills and fever.   Respiratory:  Negative for shortness of breath.    Cardiovascular:  Negative for chest pain and leg swelling.   Gastrointestinal:  Negative for nausea and vomiting.   Genitourinary:  Negative for dysuria.   Musculoskeletal:  Positive for joint pain. Negative for falls.   Skin:  Negative for rash.   Neurological:  Negative for dizziness and sensory change.       OBJECTIVE:     PHYSICAL EXAM:       Vitals:    07/16/25 0845   PainSc:   8   PainLoc: Hand       General  General: alert & oriented x 3, NAD, sitting comfortably in the exam room  Resp: breathing unlabored  GI: abdomen soft and nondistended  Psych: mood and affect appropriate  HEENT: PERRLA    Examination RIGHT HAND/THUMB:   Skin: No evidence of lacerations, lesions, open wounds or visible deformity.  Swelling: (+) moderate swelling to left hand middle digit MCP.  No evidence of infection present.  Palpation:   (+) TTP to MCP right middle digit,  R middle A1 pulley, L thumb A1 pulley  Otherwise, NTTP to bony prominences and soft tissues throughout.   ROM (active and passive):  No palpable triggering  Patient unable to fully flex middle digit MCP secondary to swelling and pain.  He notes that this is his baseline level of movement to this digit  Sensation: grossly intact to radial/median/ulnar nerve distributions  Special Testing: Tinels negative. Finkelstein negative. Thumb CMC grind (negative)  (+) grind and crepitus to R middle MCP joint  NVI    Imaging:  No new imaging performed today    ASSESSMENT/PLAN:     1. Bilateral hand pain        2. Trigger finger of left thumb        3.  Arthritis of right hand            Plan: The patient and I had a thorough discussion today.  We discussed the working diagnosis as well as several other potential alternative diagnoses.    We discussed conservative and surgical treatment options. Conservative options include rest, activity modifications, workplace modifications, po and topical analgesia (OTC and rx), formal or home PT, and others. Surgical treatment was also mentioned.    At this time, the patient would like to proceed with the following, which I agree with.    Pain Management: OTC analgesia prn for pain. Precautions advised. Pt expressed understanding  Procedures: CSI given today for R middle MCP joint and L trigger thumb. This is the 2nd CSI given to L trigger thumb. Pt understands that he will have to consider surgery if symptoms return following a 3rd trigger finger CSI to the L thumb  DME: L thumb modabber provided to pt today to wear during times of increased use of the hand.  Work status: WBAT with limitation secondary to pain  Follow up: in 3-4 month(s) with Laurence Razo PA-C    All of the patient's questions were answered and the patient will contact us if they have any questions or concerns in the interim.      Leslie Garcia PA-C  [facility]  Orthopedic Surgery

## 2025-07-16 NOTE — PROCEDURES
Tendon Sheath    Date/Time: 7/16/2025 9:00 AM    Performed by: Leslie Garcia PA-C  Authorized by: Leslie Garcia PA-C    Consent Done?:  Yes (Verbal)  Indications:  Pain and joint swelling  Site marked: the procedure site was marked    Timeout: prior to procedure the correct patient, procedure, and site was verified    Prep: patient was prepped and draped in usual sterile fashion      Location:  Thumb  Site:  L thumb flexor tendon sheath  Ultrasonic guidance for needle placement?: No    Needle size:  25 G  Approach:  Volar  Medications:  20 mg triamcinolone acetonide 40 mg/mL  Patient tolerance:  Patient tolerated the procedure well with no immediate complications

## 2025-07-22 ENCOUNTER — PATIENT MESSAGE (OUTPATIENT)
Dept: ORTHOPEDICS | Facility: CLINIC | Age: 65
End: 2025-07-22
Payer: MEDICARE